# Patient Record
Sex: MALE | Race: BLACK OR AFRICAN AMERICAN | NOT HISPANIC OR LATINO | Employment: OTHER | URBAN - METROPOLITAN AREA
[De-identification: names, ages, dates, MRNs, and addresses within clinical notes are randomized per-mention and may not be internally consistent; named-entity substitution may affect disease eponyms.]

---
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
## 2022-04-20 ENCOUNTER — HOSPITAL ENCOUNTER (EMERGENCY)
Facility: CLINIC | Age: 22
Discharge: HOME | End: 2022-04-20
Attending: EMERGENCY MEDICINE | Admitting: EMERGENCY MEDICINE
Payer: COMMERCIAL

## 2022-04-20 VITALS
DIASTOLIC BLOOD PRESSURE: 79 MMHG | BODY MASS INDEX: 31.5 KG/M2 | OXYGEN SATURATION: 100 % | SYSTOLIC BLOOD PRESSURE: 134 MMHG | TEMPERATURE: 98.7 F | RESPIRATION RATE: 18 BRPM | HEART RATE: 79 BPM | WEIGHT: 252 LBS

## 2022-04-20 DIAGNOSIS — R45.89 DYSPHORIC MOOD: ICD-10-CM

## 2022-04-20 LAB
ANION GAP SERPL CALCULATED.3IONS-SCNC: 4 MMOL/L (ref 3–14)
BASOPHILS # BLD AUTO: 0 10E3/UL (ref 0–0.2)
BASOPHILS NFR BLD AUTO: 0 %
BUN SERPL-MCNC: 13 MG/DL (ref 7–30)
CALCIUM SERPL-MCNC: 8.9 MG/DL (ref 8.5–10.1)
CHLORIDE BLD-SCNC: 106 MMOL/L (ref 98–110)
CO2 SERPL-SCNC: 26 MMOL/L (ref 20–32)
COHGB MFR BLD: 1 % (ref 0–2)
CREAT SERPL-MCNC: 0.81 MG/DL (ref 0.5–1)
EOSINOPHIL # BLD AUTO: 0.1 10E3/UL (ref 0–0.7)
EOSINOPHIL NFR BLD AUTO: 1 %
ERYTHROCYTE [DISTWIDTH] IN BLOOD BY AUTOMATED COUNT: 12.3 % (ref 10–15)
GFR SERPL CREATININE-BSD FRML MDRD: >90 ML/MIN/1.73M2
GLUCOSE BLD-MCNC: 98 MG/DL (ref 70–99)
GLUCOSE BLDC GLUCOMTR-MCNC: 100 MG/DL (ref 70–99)
HCT VFR BLD AUTO: 41.5 % (ref 40–53)
HGB BLD-MCNC: 13.5 G/DL (ref 13.3–17.7)
IMM GRANULOCYTES # BLD: 0 10E3/UL
IMM GRANULOCYTES NFR BLD: 0 %
LYMPHOCYTES # BLD AUTO: 1.1 10E3/UL (ref 0.8–5.3)
LYMPHOCYTES NFR BLD AUTO: 13 %
MCH RBC QN AUTO: 27.6 PG (ref 26.5–33)
MCHC RBC AUTO-ENTMCNC: 32.5 G/DL (ref 31.5–36.5)
MCV RBC AUTO: 85 FL (ref 78–100)
MONOCYTES # BLD AUTO: 0.7 10E3/UL (ref 0–1.3)
MONOCYTES NFR BLD AUTO: 9 %
NEUTROPHILS # BLD AUTO: 6.5 10E3/UL (ref 1.6–8.3)
NEUTROPHILS NFR BLD AUTO: 77 %
NRBC # BLD AUTO: 0 10E3/UL
NRBC BLD AUTO-RTO: 0 /100
PLATELET # BLD AUTO: 213 10E3/UL (ref 150–450)
POTASSIUM BLD-SCNC: 3.7 MMOL/L (ref 3.4–5.3)
RBC # BLD AUTO: 4.9 10E6/UL (ref 4.4–5.9)
SODIUM SERPL-SCNC: 136 MMOL/L (ref 133–144)
TSH SERPL DL<=0.005 MIU/L-ACNC: 0.84 MU/L (ref 0.4–4)
WBC # BLD AUTO: 8.5 10E3/UL (ref 4–11)

## 2022-04-20 PROCEDURE — 85025 COMPLETE CBC W/AUTO DIFF WBC: CPT | Performed by: EMERGENCY MEDICINE

## 2022-04-20 PROCEDURE — 90791 PSYCH DIAGNOSTIC EVALUATION: CPT

## 2022-04-20 PROCEDURE — 36415 COLL VENOUS BLD VENIPUNCTURE: CPT | Performed by: EMERGENCY MEDICINE

## 2022-04-20 PROCEDURE — 82375 ASSAY CARBOXYHB QUANT: CPT | Performed by: EMERGENCY MEDICINE

## 2022-04-20 PROCEDURE — 80048 BASIC METABOLIC PNL TOTAL CA: CPT | Performed by: EMERGENCY MEDICINE

## 2022-04-20 PROCEDURE — 84443 ASSAY THYROID STIM HORMONE: CPT | Performed by: EMERGENCY MEDICINE

## 2022-04-20 PROCEDURE — 99285 EMERGENCY DEPT VISIT HI MDM: CPT | Mod: 25

## 2022-04-20 ASSESSMENT — ENCOUNTER SYMPTOMS
DIARRHEA: 0
WEAKNESS: 1
APPETITE CHANGE: 1
SORE THROAT: 1
DYSPHORIC MOOD: 1
COUGH: 0
FATIGUE: 1
FEVER: 0

## 2022-04-20 NOTE — ED TRIAGE NOTES
"Patient states that he feels like he is \"high\" but that he has not taken any drugs and states this feel is worse than it has been.  Patient states previously he was only having this feeling at night and today he has it all day.  Patient states he feels confused, has not had a urge to eat or interact.  Patient states he had this feeling years ago after smoking Marijuana but that he hasn't smoked marijuana for over a month. Patient appropriate in triage.   "

## 2022-04-20 NOTE — ED PROVIDER NOTES
History   Chief Complaint:  Altered Mental Status       HPI   Pavithra Davenport is a 21 year old male with history of ADHD and ODD who presents with altered mental status. The patient reports that he feels disoriented and disconnected to his body. He states it started 3 days ago and he feels like he is in a dream like state. The patient states he has an increase in depression, fatigue, lack of interest, generalized body weakness, and decreased appetite. He reports he also has an onset of sore throat 2 days ago. The patient denies any fever, cough and diarrhea. He notes he had a similar episode 3 years ago that lasted 3 weeks long. He states he has used recreational marijuana in the past but denies any recent use.     Review of Systems   Constitutional: Positive for appetite change and fatigue. Negative for fever.   HENT: Positive for sore throat.    Respiratory: Negative for cough.    Gastrointestinal: Negative for diarrhea.   Neurological: Positive for weakness.   Psychiatric/Behavioral: Positive for dysphoric mood.   All other systems reviewed and are negative.    Allergies:  The patient has no known allergies.     Medications:  Albuterol      Past Medical History:     ADHD   ODD   Asthma     Family History:    Mother- asthma, diabetes, hypertension, hyperlipidemia, fibromyalgia   Father- asthma, psoriatic arthritis     Social History:  The patient presents with his dad.     Physical Exam     Patient Vitals for the past 24 hrs:   BP Temp Temp src Pulse Resp SpO2 Weight   04/20/22 1710 134/79 98.7  F (37.1  C) Oral 79 18 100 % 114.3 kg (252 lb)       Physical Exam      HEENT:    Oropharynx is moist, without lesions or trismus.  Eyes:    Conjunctiva normal, PERRL  Neck:    Supple, no meningismus.     CV:     Regular rate and rhythm.      No murmurs, rubs or gallops.   PULM:    Clear to auscultation bilateral.       No respiratory distress.      Good air exchange.     No rales or wheezing.  ABD:    Soft, non-tender,  non-distended.       No rebound, guarding or rigidity.  MSK:     No gross deformity to all four extremities.   LYMPH:   No cervical lymphadenopathy.  NEURO:   Alert and oriented x 3.      CN II-XII intact, speech is clear with no aphasia.     Strength is 5/5 in all 4 extremities.  Sensation is intact.       Normal muscular tone, no tremor.  Skin:    Warm, dry and intact.    Psych:    Mood is depressed, affect is appropriate and congruent.     No delusions, hallucinations.     Judgement is appropriate.     Memory intact.    Emergency Department Course     Laboratory:  Labs Ordered and Resulted from Time of ED Arrival to Time of ED Departure   GLUCOSE BY METER - Abnormal       Result Value    GLUCOSE BY METER POCT 100 (*)    BASIC METABOLIC PANEL - Normal    Sodium 136      Potassium 3.7      Chloride 106      Carbon Dioxide (CO2) 26      Anion Gap 4      Urea Nitrogen 13      Creatinine 0.81      Calcium 8.9      Glucose 98      GFR Estimate >90     TSH WITH FREE T4 REFLEX - Normal    TSH 0.84     CARBON MONOXIDE - Normal    Carbon Monoxide 1.0     CBC WITH PLATELETS AND DIFFERENTIAL    WBC Count 8.5      RBC Count 4.90      Hemoglobin 13.5      Hematocrit 41.5      MCV 85      MCH 27.6      MCHC 32.5      RDW 12.3      Platelet Count 213      % Neutrophils 77      % Lymphocytes 13      % Monocytes 9      % Eosinophils 1      % Basophils 0      % Immature Granulocytes 0      NRBCs per 100 WBC 0      Absolute Neutrophils 6.5      Absolute Lymphocytes 1.1      Absolute Monocytes 0.7      Absolute Eosinophils 0.1      Absolute Basophils 0.0      Absolute Immature Granulocytes 0.0      Absolute NRBCs 0.0          Procedures      Emergency Department Course:         Reviewed:  I reviewed nursing notes, vitals and past medical history    Assessments:  1734 I obtained history and examined the patient as noted above.     1911 I rechecked the patient and explained findings.     Consults:  2007 I spoke with DEC regarding  "patient's presentation, findings, and plan of care.      Disposition:  The patient was discharged to home.     Impression & Plan     CMS Diagnoses: None    Medical Decision Makin-year-old male presented to the ED with a sense of feeling \"disconnected.\"  This is associated with increased depressive symptoms, fatigue, lack of interest and poor appetite.  He was evaluated for a medical cause.  No electrolyte disturbance, hypo-/hyperglycemia, thyroid dysfunction.  Father was concerned about potential carbon monoxide exposure but level within normal limits.  He has no features to draw concern for an acute intracranial event.  I feel his symptoms are most likely related to mental health disorder and specifically depression.  Patient was evaluated by DEC who agrees with outpatient management.  Outpatient resources scheduled.  Close follow-up primary care physician and return to ED for any worsening symptoms.    Diagnosis:    ICD-10-CM    1. Dysphoric mood  R45.89        Scribe Disclosure:  I, Ruth Dickson, am serving as a scribe at 5:33 PM on 2022 to document services personally performed by Papi Slade MD based on my observations and the provider's statements to me.              Papi Slade MD  22    "

## 2022-04-21 NOTE — ED NOTES
4/20/2022  Pavithra Davenport 2000     [facility] Crisis Assessment    Patient was assessed: remote  Patient location: [facility]    Referral Data and Chief Complaint  Pavithra is a 21 year old who uses he/him pronouns. Patient presented to the ED with family/friends and was referred to the ED by self. Patient is presenting to the ED for the following concerns: pt presents for feelings of derealization.      Informed Consent and Assessment Methods    Patient is his own guardian. Writer met with patient and explained the crisis assessment process, including applicable information disclosures and limits to confidentiality, assessed understanding of the process, and obtained consent to proceed with the assessment. Patient was observed to be able to participate in the assessment as evidenced by alert and oriented. Assessment methods included conducting a formal interview with patient, review of medical records, collaboration with medical staff, and obtaining relevant collateral information from family and community providers when available.    Narrative Summary of Presenting Problem and Current Functioning  What led to the patient presenting for crisis services, factors that make the crisis life threatening or complex, stressors, how is this disrupting the patient's life, and how current functioning is in comparison to baseline. How is patient presenting during the assessment.     Pt presents to ED for feelings of derealization. Pt reports he has been feeling like this for a week or so. Pt reports he has experienced this before, after he has used marijuana, however, reports he has not used marijuana for approximately 2 months. Pt presents as anxious, alert, oriented, and engaged. Pt appropriately engaged in aftercare planning and appears open to mental health services. Pt appears to be functioning below his baseline. Pt denies SI/SIB/SA/HI and denies plans, means, and intent to harm himself or others.    History of the  Crisis  Duration of the current crisis, coping skills attempted to reduce the crisis, community resources used, and past presentations.    Pt reports a history of anxiety and depression. Pt denies current involvement in mental health services, and reports a history of medication management and therapy.    Collateral Information  The following information was received from Billy whose relationship to the patient is father. Information was obtained via video. Their phone number is [phone] and they last had contact with patient on today.    What happened today: father confirms pt report    What is different about patient's functioning: father reports he has observed pt to be more anxious    Concern about alcohol/drug use: No    What do you think the patient needs: father reports pt is in need of therapy     Has patient made comments about wanting to kill themselves/others:  No    If d/c is recommended, can they take part in safety/aftercare planning: Yes father reports he is able to transport and support pt in making and attending appoitnments and using skills at home    Other information: N/A          Risk Assessment    Risk of Harm to Self     ESS-6  1.a. Over the past 2 weeks, have you had thoughts of killing yourself? No  1.b. Have you ever attempted to kill yourself and, if yes, when did this last happen? No   2. Recent or current suicide plan? No   3. Recent or current intent to act on ideation? No  4. Lifetime psychiatric hospitalization? No  5. Pattern of excessive substance use? No  6. Current irritability, agitation, or aggression? No  Scoring note: BOTH 1a and 1b must be yes for it to score 1 point, if both are not yes it is zero. All others are 1 point per number. If all questions 1a/1b - 6 are no, risk is negligible. If one of 1a/1b is yes, then risk is mild. If either question 2 or 3, but not both, is yes, then risk is automatically moderate regardless of total score. If both 2 and 3 are yes, risk is  "automatically high regardless of total score.     Score: 0, negligible risk    The patient has the following risk factors for suicide: depressive symptoms, isolation and significant behavioral changes    Is the patient experiencing current suicidal ideation: No    Is the patient engaging in preparatory suicide behaviors (formulating how to act on plan, giving away possessions, saying goodbye, displaying dramatic behavior changes, etc)? No    Does the patient have access to firearms or other lethal means? no    The patient has the following protective factors: social support, voluntarily seeking mental health support, displays resiliency , expresses desire to engage in treatment, sense of obligation to people/pets and safe/stable housing    Support system information: pt reports his family and friends are supportive to him    Patient strengths: pt displays resiliency and motivation    Does the patient engage in non-suicidal self-injurious behavior (NSSI/SIB)? no    Is the patient vulnerable to sexual exploitation?  No    Is the patient experiencing abuse or neglect? no    Is the patient a vulnerable adult? No      Risk of Harm to Others  The patient has the following risk factors of harm to others: no risk factors identified    Does the patient have thoughts of harming others? No    Is the patient engaging in sexually inappropriate behavior?  no       Current Substance Abuse    Is there recent substance abuse? Substance type(s): alcohol Frequency: 1x per week Quantity: \"enough to get intoxicated\" Method: drink Duration: unknown Last use: \"the other day\"    Was a urine drug screen or blood alcohol level obtained: No    CAGE AID  Have you felt you ought to cut down on your drinking or drug use?  No  Have people annoyed you by criticizing your drinking or drug use? No  Have you felt bad or guilty about your drinking or drug use? No  Have you ever had a drink or used drugs first thing in the morning to steady your nerves " or to get rid of a hangover? No  Score: 0/4       Current Symptoms/Concerns    Symptoms  Attention, hyperactivity, and impulsivity symptoms present: Yes: Disorganized/Forgetful    Anxiety symptoms present: Yes: Generalized Symptoms: Cognitive anxiety - feelings of doom, racing thoughts, difficulty concentrating  and Excessive worry      Appetite symptoms present: Yes: Loss of Appetite     Behavioral difficulties present: No     Cognitive impairment symptoms present: No    Depressive symptoms present: Yes Appetite change/weight change , Depressed mood, Loss of interest / Anhedonia  and Sleep disturbance      Eating disorder symptoms present: No    Learning disabilities, cognitive challenges, and/or developmental disorder symptoms present: No     Manic/hypomanic symptoms present: No    Personality and interpersonal functioning difficulties present : No    Psychosis symptoms present: No      Sleep difficulties present: Yes: Difficulty falling asleep  and Difficulty staying sleep     Substance abuse disorder symptoms present: No     Trauma and stressor related symptoms present: No       Mental Status Exam   Affect: Appropriate   Appearance: Appropriate    Attention Span/Concentration: Other: disorganized?    Eye Contact: Engaged   Fund of Knowledge: Appropriate    Language /Speech Content: Fluent   Language /Speech Volume: Normal    Language /Speech Rate/Productions: Normal    Recent Memory: Intact   Remote Memory: Intact   Mood: Anxious    Orientation to Person: Yes    Orientation to Place: Yes   Orientation to Time of Day: Yes    Orientation to Date: Yes    Situation (Do they understand why they are here?): Yes    Psychomotor Behavior: Normal    Thought Content: Clear   Thought Form: Tangential       Mental Health and Substance Abuse History    History  Current and historical diagnoses or mental health concerns: Pt reports a history of anxiety and depression.    Prior MH services (inpatient, programmatic care,  "outpatient, etc) : Yes Pt denies current involvement in mental health services, and reports a history of medication management and therapy.    Has the patient used [facility] crisis team services before?: No    History of substance abuse: No    Prior KIRK services (inpatient, programmatic care, detox, outpatient, etc) : No    History of commitment: No    Family history of MH/KIRK: Yes pt reports a history of substance abuse    Trauma history: Yes pt reports his mother abused substances when he was younger    Medication  Psychotropic medications: No current medications but a history of \"ADHD medications\".    Current Care Team  Primary Care Provider: Yes. Name: Dr. Michaela Hinton. Location: [facility]. Date of last visit: 2019. Frequency: as needed. Perceived helpfulness: pt reports needs are met.    Psychiatrist: No    Therapist: No    : No    CTSS or ARMHS: No    ACT Team: No    Other: No    Release of Information  Was a release of information signed: Yes. Providers included on the release: PCP      Biopsychosocial Information    Socioeconomic Information  Current living situation: Pt reports he lives with his father    Employment/income source: pt reports he was just hired to work at [employer]    Relevant legal issues: pt denies    Cultural, Denominational, or spiritual influences on mental health care: pt denies    Is the patient active in the  or a : No      Relevant Medical Concerns   Patient identifies concerns with completing ADLs? No     Patient can ambulate independently? Yes     Other medical concerns? No     History of concussion or TBI? No        Diagnosis    300.02 (F41.1) Generalized Anxiety Disorder - by history     296.31 (F33.0) Major Depressive Disorder, Recurrent Episode, Mild _ - by history       Therapeutic Intervention  The following therapeutic methodologies were employed when working with the patient: establishing rapport, active listening, assessing dimensions of crisis, " solution focused brief therapy, identifying additional supports and alternative coping skills, establishing a discharge plan, safety planning, psychoeducation, motivational interviewing, brief supportive therapy, trauma informed care, DBT skills, treatment planning and harm reduction. Patient response to intervention: pt appears willing and open to interventions.      Disposition  Recommended disposition: Individual Therapy and Medication Management      Reviewed case and recommendations with attending provider. Attending Name: Dr. Slade      Attending concurs with disposition: Yes      Patient concurs with disposition: Yes      Guardian concurs with disposition: NA     Final disposition: Individual therapy  and Medication management.       Clinical Substantiation of Recommendations   Rationale with supporting factors for disposition and diagnosis.     Pt presents to ED for feelings of derealization. Pt reports he has been feeling like this for a week or so. Pt reports he has experienced this before, after he has used marijuana, however, reports he has not used marijuana for approximately 2 months. Pt presents as anxious, alert, oriented, and engaged. Pt appropriately engaged in aftercare planning and appears open to mental health services. Pt appears to be functioning below his baseline. Pt denies SI/SIB/SA/HI and denies plans, means, and intent to harm himself or others. Pt is recommended to therapy and medication management and was scheduled for appointments.       Assessment Details  Patient interview started at: 7:30PM and completed at: 8:03PM.    Total duration spent on the patient case in minutes: .75 hrs     CPT code(s) utilized: 93011 - Psychotherapy for Crisis - 60 (30-74*) min       Aftercare and Safety Planning  Follow up plans with MH/KIRK services: Yes follow up with therapy and medication management appointments      Aftercare plan placed in the AVS and provided to patient: Yes. Given to patient by  RN    Ne Dumas, [facility]        Aftercare Plan    Upcoming Appointments    Date: Wednesday, 4/27/2022  Time: 9:00 am - 10:00 am  Provider: Elizabeth Chahal  Supervised by: Amber Lincoln MA  [facility]  Location: [facility], [address] [place] [id number]  Phone: [phone]  Type: Teletherapy    Patient Instructions: For all appointments: you will be sent information to fill out the intake paperwork online. Please fill the paperwork prior to your appointment. For telehealth appointments: you will also be sent a zoom link to attend the appointment remotely.      Date: Thursday, 5/5/2022  Time: 11:00 am - 12:00 pm  Provider: Anna Srivastava  MSN  CNP,PMHNP,RN  Location: [facility], [address] [place] [address] [address], [place] [address]  Phone: [phone]  Type: Medication Mgmt - Initial (In-Person)      If I am feeling unsafe or I am in a crisis, I will:   Contact my established care providers   Call the National Suicide Prevention Lifeline: 884.358.2002   Go to the nearest emergency room   Call 911     Warning signs that I or other people might notice when a crisis is developing for me:     I am having increasing suicidal thoughts that turn to plans with intent or means  I am having additional urges to self-harm    My emotions are of hopelessness; feeling like there's no way out.  Rage or anger.  Engaging in risky activities without thinking  Withdrawing from family/friends  Dramatic mood swings  Drastic personality changes   Use of alcohol or drugs  Postings on social media  Neglect of personal hygiene or cares     Things I am able to do on my own to cope or help me feel better:    Spending quality time with loved ones  Staying hydrated  Eating balanced meals  Going for a walk every day  Take care of daily responsibilities/needs  Focus on positive self-talk vs negative self-talk    Things that I am able to do with others to cope or help me better:   Exercise  Music  Deep  breathing  Meditations  Journal  Self-regulate  Self check-in  Ask for help  5, 4, 3, 2, 1  Progressive muscle relaxation    Reduce Extreme Emotion  QUICKLY:  Changing Your Body Chemistry      T:  Change your body Temperature to change your autonomic nervous system     Use Ice Water to calm yourself down FAST     Put your face in a bowl of ice water (this is the best way; have the person keep his/her face in ice water for 30-45 seconds - initial research is showing that the longer s/he can hold her/his face in the water, the better the response), or     Splash ice water on your face, or hold an ice pack on your face      I:  Intensely exercise to calm down a body revved up by emotion     Examples: running, walking fast, jumping, playing basketball, weight lifting, swimming, calisthenics, etc.     Engage in exercises that DO NOT include violent behaviors. Exercises that utilize violent behaviors tend to function as  behavioral rehearsal,  and rather than calming the person down, may actually  rev  the person up more, increasing the likelihood of violence, and lessening the likelihood that they will  burn off  energy     P:  Progressively relax your muscles     Starting with your hands, moving to your forearms, upper arms, shoulders, neck, forehead, eyes, cheeks and lips, tongue and teeth, chest, upper back, stomach, buttocks, thighs, calves, ankles, feet     Tense (10 seconds,   of the way), then relax each muscle (all the way)     Notice the tension     Notice the difference when relaxed (by tensing first, and then relaxing, you are able to get a more thorough relaxation than by simply relaxing)      P: Paced breathing to relax     The standard technique is to begin with counting the number of steps one takes for a typical inhale, then counting the steps one takes for a typical exhale, and then lengthening the amount of steps for the exhalation by one or two steps.  OR    Repeat this pattern for 1-2  minutes    Inhale for four (4) seconds     Exhale for six (6) to eight (8) seconds     Research demonstrated that one can change one's overall level of anxiety by doing this exercise for even a few minutes per day        Things I can use or do for distraction:   Reach out to/spend time with family, friends  Shower  Exercise  Chores or do a project  Listen to music  Watch movie/TV  Listening to music  Journaling  Reading a book  Meditating  Call a friend    Changes I can make to support my mental health and wellness:    -I will abstain from all mood altering chemicals not currently prescribed to me   -I will attend scheduled mental health therapy and psychiatric appointments and follow all   recommendations  -I will commit to 30 minutes of self care daily - this can be as simple as taking a shower, going for a   walk, cooking a meal, read, writing, etc  -I will practice square breathing when I begin to feel anxious - in breath through the nose for the count   of 4 and the first line on the square. Out breath through the mouth for the count of 4 for the second line   of the square. Repeat to complete the square. Repeat the square as many times as needed.  - I will use distraction skills of: going for walks, watching TV, spending time outside, calling a friend or   family member  -Use community resources, including hotline numbers, [facility] crisis and support meetings  -Maintain a daily schedule/routine  -Practice deep breathing skills  -Download a meditation yoandy and spend 15-20 minutes per day mediating/relaxing. Some apps to   download include: Calm, Headspace and Insight Timer. All 3 of these apps have free version    People in my life that I can ask for help:   Family  Friends    Your [facility] has a mental health crisis team you can call 24/7:   [facility] Crisis Line Number: [phone]      Other things that are important when I'm in crisis:   Ask for help    Additional resources and information:     Mental Health  "Apps  My3  https://my3app.org/    VirtualHopeBox  [email or web address]       Professionals or Agencies I Can Contact During A Crisis:       Crisis Lines  Crisis Text Line  Text 317913  You will be connected with a trained live crisis counselor to provide support.    [employer] (LGBTQ Youth Crisis Line)  8.936.450.0429  text START to 640-349    National [place] on Mental Illness (UMBERTO)  684.934.9328 or 3.810.UMBERTO.HELPS    National Suicide Prevention Lifeline at 4-938-484-YYVO (3054)     Throughout  [place]: call **CRISIS (**151080)     Crisis Text Line: is available for free, 24/7 by texting [place] to 089782    [facility]  Fast Tracker  Linking people to mental health and substance use disorder resources  [employer].org     [place] Mental Health Warm Line  Peer to peer support  Monday thru Saturday, 12 pm to 10 pm  436.527.4020 or 1.596.915.1126  Text \"Support\" to 14287     National [place] on Mental Illness (www.[place].umberto.org): 808.963.4460 or 087-217-0757     [facility] Phone (free remote counseling): [phone] Web address:   [email or web address]/     www.[employer] (filter for insurance, gender preference, etc.)    [facility]  [phone]  *offers individual therapy, medication management and Mental Health Case Workers; can self refer    Please follow up with scheduled providers to ensure all necessary paperwork is filled out prior to your   scheduled telehealth appointments.     Coordinators from [facility] will be calling within two business days to ensure   that you have the resources you may need or provide assistance with scheduling (Phone number: [phone].).    Remember: give the referrals 3 sessions prior to calling it quits. Do you trust them? Do you feel   understood? Do you think they can help? Check in with yourself after each session    Please reach out to the [facility]([phone]) regarding further mental health appointment needs for this emergency department visit.    [facility] SCHEDULING:  Today you were seen by a licensed mental health professional through [facility] sevices, [facility] ([facility])  for a crisis assessment in the Emergency Department at [facility].  It is recommended that you follow up with your estabished providers (psychiatrist, menta health therapist, and/or primary care doctor - as relevant) as soon as possible. Coordinators from [facility] will be calling you in the next 24-48 hours to ensure that you have the resources you need.  You can so contact [facility] coordinators directly at [phone].     [facility] maintains an extensive network of licensed behavioral health providers to connect patients with the services they need.  We do not charge providers a fee to participate in our referral network.  We match patients with providers based on a patient s specific needs, insurance coverage, and location.  Our first effort will be to refer you to a provider within your care system, and will utilize providers outside your care system as needed.     Additional information  Today you were seen by a licensed mental health professional through [facility] and Transition services, [facility] ([facility])  for a crisis assessment in the Emergency Department at [facility].  It is recommended that you follow up with your established providers (psychiatrist, mental health therapist, and/or primary care doctor - as relevant) as soon as possible. Coordinators from [facility] will be calling you in the next 24-48 hours to ensure that you have the resources you need.  You can also contact [facility] coordinators directly at [phone]. You may have been scheduled for or offered an appointment with a mental health provider. [facility] maintains an extensive network of licensed behavioral health providers to connect patients with the services they  need.  We do not charge providers a fee to participate in our referral network.  We match patients with providers based on a patient's specific needs, insurance coverage, and location.  Our first effort will be to refer you to a provider within your care system, and will utilize providers outside your care system as needed.

## 2022-04-21 NOTE — DISCHARGE INSTRUCTIONS
Aftercare Plan    Upcoming Appointments    Date: Wednesday, 4/27/2022  Time: 9:00 am - 10:00 am  Provider: Elizabeth Chahal  Supervised by: Amber Lincoln MA  [facility]  Location: [facility], [address] [place], [place] [id number]  Phone: [phone]  Type: Teletherapy    Patient Instructions: For all appointments: you will be sent information to fill out the intake paperwork online. Please fill the paperwork prior to your appointment. For telehealth appointments: you will also be sent a zoom link to attend the appointment remotely.      Date: Thursday, 5/5/2022  Time: 11:00 am - 12:00 pm  Provider: Anna Srivastava  MSN  CNP,PMHNP,RN  Location: [facility], [address] [place] [address], [facility] [address], [place] [address]  Phone: [phone]  Type: Medication Mgmt - Initial (In-Person)      If I am feeling unsafe or I am in a crisis, I will:   Contact my established care providers   Call the National Suicide Prevention Lifeline: 340.931.2193   Go to the nearest emergency room   Call 911     Warning signs that I or other people might notice when a crisis is developing for me:     I am having increasing suicidal thoughts that turn to plans with intent or means  I am having additional urges to self-harm    My emotions are of hopelessness; feeling like there's no way out.  Rage or anger.  Engaging in risky activities without thinking  Withdrawing from family/friends  Dramatic mood swings  Drastic personality changes   Use of alcohol or drugs  Postings on social media  Neglect of personal hygiene or cares     Things I am able to do on my own to cope or help me feel better:    Spending quality time with loved ones  Staying hydrated  Eating balanced meals  Going for a walk every day  Take care of daily responsibilities/needs  Focus on positive self-talk vs negative self-talk    Things that I am able to do with others to cope or help me better:   Exercise  Music  Deep  breathing  Meditations  Journal  Self-regulate  Self check-in  Ask for help  5, 4, 3, 2, 1  Progressive muscle relaxation    Reduce Extreme Emotion  QUICKLY:  Changing Your Body Chemistry      T:  Change your body Temperature to change your autonomic nervous system   Use Ice Water to calm yourself down FAST   Put your face in a bowl of ice water (this is the best way; have the person keep his/her face in ice water for 30-45 seconds - initial research is showing that the longer s/he can hold her/his face in the water, the better the response), or   Splash ice water on your face, or hold an ice pack on your face      I:  Intensely exercise to calm down a body revved up by emotion   Examples: running, walking fast, jumping, playing basketball, weight lifting, swimming, calisthenics, etc.   Engage in exercises that DO NOT include violent behaviors. Exercises that utilize violent behaviors tend to function as  behavioral rehearsal,  and rather than calming the person down, may actually  rev  the person up more, increasing the likelihood of violence, and lessening the likelihood that they will  burn off  energy     P:  Progressively relax your muscles   Starting with your hands, moving to your forearms, upper arms, shoulders, neck, forehead, eyes, cheeks and lips, tongue and teeth, chest, upper back, stomach, buttocks, thighs, calves, ankles, feet   Tense (10 seconds,   of the way), then relax each muscle (all the way)   Notice the tension   Notice the difference when relaxed (by tensing first, and then relaxing, you are able to get a more thorough relaxation than by simply relaxing)      P: Paced breathing to relax   The standard technique is to begin with counting the number of steps one takes for a typical inhale, then counting the steps one takes for a typical exhale, and then lengthening the amount of steps for the exhalation by one or two steps.  OR  Repeat this pattern for 1-2 minutes  Inhale for four (4) seconds    Exhale for six (6) to eight (8) seconds   Research demonstrated that one can change one's overall level of anxiety by doing this exercise for even a few minutes per day        Things I can use or do for distraction:   Reach out to/spend time with family, friends  Shower  Exercise  Chores or do a project  Listen to music  Watch movie/TV  Listening to music  Journaling  Reading a book  Meditating  Call a friend    Changes I can make to support my mental health and wellness:    -I will abstain from all mood altering chemicals not currently prescribed to me   -I will attend scheduled mental health therapy and psychiatric appointments and follow all   recommendations  -I will commit to 30 minutes of self care daily - this can be as simple as taking a shower, going for a   walk, cooking a meal, read, writing, etc  -I will practice square breathing when I begin to feel anxious - in breath through the nose for the count   of 4 and the first line on the square. Out breath through the mouth for the count of 4 for the second line   of the square. Repeat to complete the square. Repeat the square as many times as needed.  - I will use distraction skills of: going for walks, watching TV, spending time outside, calling a friend or   family member  -Use community resources, including hotline numbers, [facility] crisis and support meetings  -Maintain a daily schedule/routine  -Practice deep breathing skills  -Download a meditation yoandy and spend 15-20 minutes per day mediating/relaxing. Some apps to   download include: Calm, Headspace and Insight Timer. All 3 of these apps have free version    People in my life that I can ask for help:   Family  Friends    Your [facility] has a mental health crisis team you can call 24/7:   [facility] Crisis Line Number: [phone]      Other things that are important when I'm in crisis:   Ask for help    Additional resources and information:     Mental Health Apps  My3   "https://my3app.org/    VirtualHopeBox  https://GuidePal/apps/virtual-hope-box/       Professionals or Agencies I Can Contact During A Crisis:       Crisis Lines  Crisis Text Line  Text 642457  You will be connected with a trained live crisis counselor to provide support.    The [employer] Project (LGBTQ Youth Crisis Line)  0.601.640.5852  text START to 810-840    National Normanna on Mental Illness (UMBERTO)  974.409.8506 or 6.88.UMBERTO.HELPS    National Suicide Prevention Lifeline at 0-431-594-GYPV (3806)     Throughout  [place]: call **CRISIS (**701126)     Crisis Text Line: is available for free, 24/7 by texting [place] to 434217    Productify  Fast Tracker  Linking people to mental health and substance use disorder resources  Ideal Power.org     [place] Mental Health Warm Line  Peer to peer support  Monday thru Saturday, 12 pm to 10 pm  740.512.8111 or 1.232.488.6029  Text \"Support\" to 19995     National Normanna on Mental Illness (www.[place].umberto.org): 795.165.2851 or 007-194-4145     [facility] Phone (free remote counseling): 711.306.8379 Web address:   https://VasoGenix.org/     www.S[employer] Media (filter for insurance, gender preference, etc.)    [facility]  [phone]  *offers individual therapy, medication management and Mental Health Case Workers; can self refer    Please follow up with scheduled providers to ensure all necessary paperwork is filled out prior to your   scheduled telehealth appointments.     Coordinators from [facility] will be calling within two business days to ensure   that you have the resources you may need or provide assistance with scheduling (Phone number: [phone].).    Remember: give the referrals 3 sessions prior to calling it quits. Do you trust them? Do you feel   understood? Do you think they can help? Check in with yourself after each session    Please reach out to the Diagnostic Evaluation " [place]([phone]) regarding further mental health appointment needs for this emergency department visit.    [facility] SCHEDULING:  Today you were seen by a licensed mental health professional through [facility] and Transition sevices, [facility] ([facility])  for a crisis assessment in the Emergency Department at [facility].  It is recommended that you follow up with your estabished providers (psychiatrist, menta health therapist, and/or primary care doctor - as relevant) as soon as possible. Coordinators from [facility] will be calling you in the next 24-48 hours to ensure that you have the resources you need.  You can so contact [facility] coordinators directly at [phone].     [facility] maintains an extensive network of licensed behavioral health providers to connect patients with the services they need.  We do not charge providers a fee to participate in our referral network.  We match patients with providers based on a patient s specific needs, insurance coverage, and location.  Our first effort will be to refer you to a provider within your care system, and will utilize providers outside your care system as needed.     Additional information  Today you were seen by a licensed mental health professional through Triage and Transition services, [facility] ([facility])  for a crisis assessment in the Emergency Department at [facility].  It is recommended that you follow up with your established providers (psychiatrist, mental health therapist, and/or primary care doctor - as relevant) as soon as possible. Coordinators from [facility] will be calling you in the next 24-48 hours to ensure that you have the resources you need.  You can also contact [facility] coordinators directly at [phone]. You may have been scheduled for or offered an appointment with a mental health provider. [facility] maintains an extensive network of licensed behavioral health providers to connect patients with the services they need.  We  do not charge providers a fee to participate in our referral network.  We match patients with providers based on a patient's specific needs, insurance coverage, and location.  Our first effort will be to refer you to a provider within your care system, and will utilize providers outside your care system as needed.

## 2022-04-26 ENCOUNTER — TELEPHONE (OUTPATIENT)
Dept: FAMILY MEDICINE | Facility: CLINIC | Age: 22
End: 2022-04-26
Payer: COMMERCIAL

## 2022-04-26 DIAGNOSIS — F41.9 ANXIETY: Primary | ICD-10-CM

## 2022-04-26 NOTE — TELEPHONE ENCOUNTER
Called # below     Advised pt's father on the information below     Patient's father stated an understanding and agreed with plan.    Zahra Steven RN, BSN  [facility] [place] [facility]

## 2022-04-26 NOTE — TELEPHONE ENCOUNTER
There is no C2C on file for father     Routing to PCP for review     Zahra Steven RN, BSN  [facility]

## 2022-04-26 NOTE — TELEPHONE ENCOUNTER
OK, thanks for update.  I will also place a referral for psychiatry.  Especially with concerns of bipolar disorder, they will need to be involved.  They usually book out a ways so I'd like them to schedule this asap.  They can definitely see me in the interim.      Michaela Hinton MBA, MS, PA-C  [facility]

## 2022-04-26 NOTE — TELEPHONE ENCOUNTER
Called # below     Dad stated that pt was in the hospital on 4/20/2022 he is not going in to the hospital today. He is going to the city with his mom.   Dad stated that pt is set up for therapy from the hospital - but he is concerned as pt's anxiety has been increasing and been having very little interest I things at home. He also has noted that he feels detachedfrom reality.   Denies wanting to harm self or harming others  - pt is safe and with his mom right now  On no medications    Dad just want to FLORINDA CARLOS- to be aware of the hospital visit and that once pt is back on Thursday he will call to make an follow up appointment for the pt with Jose Alfredo CARLOS,  as he thinks pt may be bipolar -     RN advised that pt needs to keep his therapy appointments as well as keeping in touch with his PCP with phone or in person visits.   RN offered the crisis line number and dad said he does not need them as he does not feel the pt will be in crisis    Dad said he will call once he sees pt on Thursday     Routing to PCP as an ELINOR Steven RN, BSN  [facility]

## 2022-04-26 NOTE — TELEPHONE ENCOUNTER
Reason for Call:  Other call back    Detailed comments: TRACEY IS SUFFERING FROM MENTAL HEALTH. DAD THINKS PT MAY BE BI-POLAR. TRACEY HAD TO MISS TODAYS APPT BECAUSE TAKEN TO HOSP FOR MENTAL HEALTH. DAD REQUESTS CALL SO HE DISCUSS NEXT STEPS WITH XOCHITL HUMPHREYS    Phone Number Patient can be reached at: Other phone number:  [phone]    Best Time: ANYTIME    Can we leave a detailed message on this number? YES    Call taken on 4/26/2022 at 10:05 AM by Ayanna Fleming

## 2022-05-07 ENCOUNTER — HEALTH MAINTENANCE LETTER (OUTPATIENT)
Age: 22
End: 2022-05-07

## 2022-05-18 ENCOUNTER — VIRTUAL VISIT (OUTPATIENT)
Dept: INTERNAL MEDICINE | Facility: CLINIC | Age: 22
End: 2022-05-18
Payer: COMMERCIAL

## 2022-05-18 DIAGNOSIS — J02.9 SORE THROAT: Primary | ICD-10-CM

## 2022-05-18 PROCEDURE — 99213 OFFICE O/P EST LOW 20 MIN: CPT | Mod: 95 | Performed by: NURSE PRACTITIONER

## 2022-05-18 NOTE — PROGRESS NOTES
Pavithra is a 21 year old who is being evaluated via a billable video visit.      How would you like to obtain your AVS? Mail  If the video visit is dropped, the invitation should be resent by: Text to cell phone: [phone]  Will anyone else be joining your video visit? No    Video Start Time: 3:35 PM    Assessment & Plan   Problem List Items Addressed This Visit    None     Visit Diagnoses     Sore throat    -  Primary    Relevant Orders    Symptomatic; Yes; 5/18/2022 COVID-19 Virus (Coronavirus) by PCR    Streptococcus A Rapid Screen w/Reflex to PCR         - Rule out strep. He will be scheduled for a strep swab to confirm. Will do COVID testing as well. Reviewed the risk of antibiotic resistance if he does have strep but does not take the full course of antibiotics and strongly encouraged him to take the entire course if antibiotics are indicated.   - Return if symptoms worsen or fail to improve     Return in about 3 days (around 5/21/2022), or if symptoms worsen or fail to improve.    Magaly Forbes NP  [facility]   Pavithra is a 21 year old who presents for the following health issues    HPI     He has had a sore throat and body aches that started today. He denies cough, SOB, headache. He has not checked a temperature but does not feel feverish. No sick contacts. He has a history of recurrent strep by his report. He admits that he does not take the full course of antibiotics when he has strep.           Objective           Vitals:  No vitals were obtained today due to virtual visit.    Physical Exam   GENERAL: Healthy, alert and no distress  EYES: Eyes grossly normal to inspection.  No discharge or erythema, or obvious scleral/conjunctival abnormalities.  RESP: No audible wheeze, cough, or visible cyanosis.  No visible retractions or increased work of breathing.                Video-Visit Details    Type of service:  Video Visit    Video End Time:3:49 PM    Originating Location  (pt. Location): Home    Distant Location (provider location):  [facility]     Platform used for Video Visit: Zac

## 2022-05-19 ENCOUNTER — LAB (OUTPATIENT)
Dept: URGENT CARE | Facility: URGENT CARE | Age: 22
End: 2022-05-19
Attending: NURSE PRACTITIONER
Payer: COMMERCIAL

## 2022-05-19 DIAGNOSIS — J02.9 SORE THROAT: ICD-10-CM

## 2022-05-19 LAB
DEPRECATED S PYO AG THROAT QL EIA: NEGATIVE
GROUP A STREP BY PCR: NOT DETECTED

## 2022-05-19 PROCEDURE — U0005 INFEC AGEN DETEC AMPLI PROBE: HCPCS

## 2022-05-19 PROCEDURE — U0003 INFECTIOUS AGENT DETECTION BY NUCLEIC ACID (DNA OR RNA); SEVERE ACUTE RESPIRATORY SYNDROME CORONAVIRUS 2 (SARS-COV-2) (CORONAVIRUS DISEASE [COVID-19]), AMPLIFIED PROBE TECHNIQUE, MAKING USE OF HIGH THROUGHPUT TECHNOLOGIES AS DESCRIBED BY CMS-2020-01-R: HCPCS

## 2022-05-19 PROCEDURE — 87651 STREP A DNA AMP PROBE: CPT

## 2022-05-20 ENCOUNTER — TELEPHONE (OUTPATIENT)
Dept: NURSING | Facility: CLINIC | Age: 22
End: 2022-05-20
Payer: COMMERCIAL

## 2022-05-20 LAB — SARS-COV-2 RNA RESP QL NAA+PROBE: POSITIVE

## 2022-05-20 NOTE — TELEPHONE ENCOUNTER
Coronavirus (COVID-19) Notification    Caller Name (Patient, parent, daughter/son, grandparent, etc)  The patient      Reason for call  Notify of Positive Coronavirus (COVID-19) lab results, assess symptoms,  review [facility] recommendations    Lab Result    Lab test:  2019-nCoV rRt-PCR or SARS-CoV-2 PCR    Oropharyngeal AND/OR nasopharyngeal swabs is POSITIVE for 2019-nCoV RNA/SARS-COV-2 PCR (COVID-19 virus)      Gather patient reported symptoms   Assessment   Current Symptoms at time of phone call, reported by patient: (if no symptoms, document: No symptoms] Runny nose   Date of symptom(s) onset (if applicable) 3 days ago     If at time of call, Patients symptoms have worsened, the Patient should contact 911 or have someone drive them to Emergency Dept promptly:      If Patient calling 911, inform 911 personal that you have tested positive for the Coronavirus (COVID-19).  Place mask on and await 911 to arrive.    If Emergency Dept, If possible, please have another adult drive you to the Emergency Dept but you need to wear mask when in contact with other people.      Treatment Options:   Patient classified as COVID treatment eligible by Epic high risk algorithm: Yes  Is the patient symptomatic at the time of result notification? Yes. Was the onset of symptoms within the last 5 days? Yes.   There are now oral medications available for the treatment of COVID-19.  Taking one of these medications within the first five days of symptoms (when people may not yet feel severely ill) has been shown to make people feel better, prevent them from getting sicker, and preventing hospitalization and death.   Does the patient agree to have a visit with a provider to discuss treatment options? No.  Reason patient declined:  Worried about side effects from the drugs      Review information with Patient    Your result was positive. This means you have COVID-19 (coronavirus).    How can I protect others?    These guidelines are  for isolating before returning to work, school or .    If you DO have symptoms    Stay home and away from others     For at least 5 days after your symptoms started, AND    You are fever free for 24 hours (with no medicine that reduces fever), AND    Your other symptoms are better    Wear a mask for 10 full days anytime you are around others    If you DON'T have symptoms    Stay home and away from others for at least 5 days after your positive test    Wear a mask for 10 full days anytime you are around others    There may be different guidelines for healthcare facilities.  Please check with the specific sites before arriving.    If you have been told by a doctor that you were severely ill with COVID-19 or are immunocompromised, you should isolate for at least 10 days.    You should not go back to work until you meet the guidelines above for ending your home isolation. You don't need to be retested for COVID-19 before going back to work--studies show that you won't spread the virus if it's been at least 10 days since your symptoms started (or 20 days, if you have a weak immune system).    Employers, schools, and daycares: This is an official notice for this person's medical guidelines for returning in-person.  They must meet the above guidelines before going back to work, school or  in person.    You will receive a positive COVID-19 letter via [employer] or the mail soon with additional self-care information (exception, no letters will be sent to presurgical/preprocedure patients).    Would you like me to review some of that information with you now?  No    If you were tested for an upcoming procedure, please contact your provider for next steps.    Tisha Rivers LPN

## 2022-05-20 NOTE — TELEPHONE ENCOUNTER
Patient classified as COVID treatment eligible by Epic high risk algorithm:  Yes    Coronavirus (COVID-19) Notification    Reason for call  Notify of POSITIVE COVID-19 lab result, assess symptoms,  review [facility] recommendations    Lab Result   Lab test for 2019-nCoV rRt-PCR or SARS-COV-2 PCR  Oropharyngeal AND/OR nasopharyngeal swabs were POSITIVE for 2019-nCoV RNA [OR] SARS-COV-2 RNA (COVID-19) RNA     We have been unable to reach patient by phone at this time to notify of their Positive COVID-19 result.    Left voicemail message requesting a call back to [phone] [facility] for results.        A Positive COVID-19 letter will be sent via [email or web address] or the mail. (Exception, no letters sent to Presurgerical/Preprocedure Patients)    Brina Martini

## 2022-07-13 ENCOUNTER — TELEPHONE (OUTPATIENT)
Dept: FAMILY MEDICINE | Facility: CLINIC | Age: 22
End: 2022-07-13

## 2022-07-13 NOTE — TELEPHONE ENCOUNTER
"Pt states he has strep throat symptoms since Monday.   Sore throat, body fatigue then. Had headache then.   He states \"No idea if exposed to COVID or strep.\"     He states he cannot go back to work until he gets a Strep test .   Advised to have COVID test done too but he states he doesn't want to get this done.     He had appt at  to have both, but they didn't have orders for Strep and he didn't want to do the COVID test, so didn't go.     Pt states he would like to talk with someone at his current clinic at [facility].   Will route message there since they are on the phones currently.   Pt hung up the phone before this writer could relay any further message.                   "

## 2022-07-13 NOTE — TELEPHONE ENCOUNTER
"Attempt # 1    Called #   Telephone Information:   Mobile [phone]       \"The number you are dialing is not allowed to receive calls at this time\"    Zahra Steven RN, BSN  [facility]           "

## 2022-07-14 NOTE — TELEPHONE ENCOUNTER
Attempt # 2    Called # [phone] rang once then beeping like busy signal, then call ended.    Jihan Starks RN  [facility]

## 2023-01-07 ENCOUNTER — HEALTH MAINTENANCE LETTER (OUTPATIENT)
Age: 23
End: 2023-01-07

## 2023-04-04 ENCOUNTER — TELEPHONE (OUTPATIENT)
Dept: FAMILY MEDICINE | Facility: CLINIC | Age: 23
End: 2023-04-04
Payer: MEDICARE

## 2023-04-04 NOTE — TELEPHONE ENCOUNTER
We can do a video visit to discuss an emotional support animal/justify its need.  Alternatively, if he has a psych provider, they can do this for him too.      Michaela Hinton MBA, MS, PA-C  [facility]

## 2023-04-04 NOTE — TELEPHONE ENCOUNTER
Place call to patient to help schedule a VV with Michaela Hinton 4/19/2023. Patient does not have a psych provider.

## 2023-04-04 NOTE — TELEPHONE ENCOUNTER
Patient is calling to request an YOUSIF letter as soon as able. Patient is requesting letter to be uploaded to [employer] if unable patient can pick it up in clinic.

## 2023-04-19 ENCOUNTER — VIRTUAL VISIT (OUTPATIENT)
Dept: FAMILY MEDICINE | Facility: CLINIC | Age: 23
End: 2023-04-19
Payer: MEDICARE

## 2023-04-19 DIAGNOSIS — Z72.0 CURRENT EVERY DAY NICOTINE VAPING: ICD-10-CM

## 2023-04-19 DIAGNOSIS — F91.3 OPPOSITIONAL DEFIANT DISORDER: ICD-10-CM

## 2023-04-19 DIAGNOSIS — F90.2 ATTENTION DEFICIT HYPERACTIVITY DISORDER (ADHD), COMBINED TYPE: ICD-10-CM

## 2023-04-19 DIAGNOSIS — J45.990 EXERCISE-INDUCED ASTHMA: ICD-10-CM

## 2023-04-19 DIAGNOSIS — F41.1 GAD (GENERALIZED ANXIETY DISORDER): Primary | ICD-10-CM

## 2023-04-19 PROBLEM — S80.01XA CONTUSION OF RIGHT KNEE, INITIAL ENCOUNTER: Status: RESOLVED | Noted: 2019-08-19 | Resolved: 2023-04-19

## 2023-04-19 PROBLEM — M25.561 ACUTE PAIN OF RIGHT KNEE: Status: RESOLVED | Noted: 2019-08-21 | Resolved: 2023-04-19

## 2023-04-19 PROCEDURE — 99213 OFFICE O/P EST LOW 20 MIN: CPT | Mod: VID | Performed by: PHYSICIAN ASSISTANT

## 2023-04-19 ASSESSMENT — PATIENT HEALTH QUESTIONNAIRE - PHQ9
SUM OF ALL RESPONSES TO PHQ QUESTIONS 1-9: 13
10. IF YOU CHECKED OFF ANY PROBLEMS, HOW DIFFICULT HAVE THESE PROBLEMS MADE IT FOR YOU TO DO YOUR WORK, TAKE CARE OF THINGS AT HOME, OR GET ALONG WITH OTHER PEOPLE: SOMEWHAT DIFFICULT
SUM OF ALL RESPONSES TO PHQ QUESTIONS 1-9: 13

## 2023-04-19 ASSESSMENT — ANXIETY QUESTIONNAIRES
2. NOT BEING ABLE TO STOP OR CONTROL WORRYING: SEVERAL DAYS
7. FEELING AFRAID AS IF SOMETHING AWFUL MIGHT HAPPEN: SEVERAL DAYS
3. WORRYING TOO MUCH ABOUT DIFFERENT THINGS: SEVERAL DAYS
8. IF YOU CHECKED OFF ANY PROBLEMS, HOW DIFFICULT HAVE THESE MADE IT FOR YOU TO DO YOUR WORK, TAKE CARE OF THINGS AT HOME, OR GET ALONG WITH OTHER PEOPLE?: SOMEWHAT DIFFICULT
1. FEELING NERVOUS, ANXIOUS, OR ON EDGE: SEVERAL DAYS
6. BECOMING EASILY ANNOYED OR IRRITABLE: SEVERAL DAYS
GAD7 TOTAL SCORE: 7
4. TROUBLE RELAXING: SEVERAL DAYS
5. BEING SO RESTLESS THAT IT IS HARD TO SIT STILL: SEVERAL DAYS
7. FEELING AFRAID AS IF SOMETHING AWFUL MIGHT HAPPEN: SEVERAL DAYS
IF YOU CHECKED OFF ANY PROBLEMS ON THIS QUESTIONNAIRE, HOW DIFFICULT HAVE THESE PROBLEMS MADE IT FOR YOU TO DO YOUR WORK, TAKE CARE OF THINGS AT HOME, OR GET ALONG WITH OTHER PEOPLE: SOMEWHAT DIFFICULT

## 2023-04-19 ASSESSMENT — ASTHMA QUESTIONNAIRES
QUESTION_2 LAST FOUR WEEKS HOW OFTEN HAVE YOU HAD SHORTNESS OF BREATH: ONCE OR TWICE A WEEK
QUESTION_5 LAST FOUR WEEKS HOW WOULD YOU RATE YOUR ASTHMA CONTROL: WELL CONTROLLED
QUESTION_1 LAST FOUR WEEKS HOW MUCH OF THE TIME DID YOUR ASTHMA KEEP YOU FROM GETTING AS MUCH DONE AT WORK, SCHOOL OR AT HOME: A LITTLE OF THE TIME
ACT_TOTALSCORE: 20
QUESTION_4 LAST FOUR WEEKS HOW OFTEN HAVE YOU USED YOUR RESCUE INHALER OR NEBULIZER MEDICATION (SUCH AS ALBUTEROL): ONCE A WEEK OR LESS
QUESTION_3 LAST FOUR WEEKS HOW OFTEN DID YOUR ASTHMA SYMPTOMS (WHEEZING, COUGHING, SHORTNESS OF BREATH, CHEST TIGHTNESS OR PAIN) WAKE YOU UP AT NIGHT OR EARLIER THAN USUAL IN THE MORNING: ONCE OR TWICE
ACT_TOTALSCORE: 20

## 2023-04-19 NOTE — LETTER
[facility]  [address] [place] [address] [phone]       April 19, 2023    Pavithra Davenport  [address]  [place] [address]    To Whom it May Concern:      Mr. Pavithra Davenport is a patient who has been under my care since 2014.  Pavithra carries diagnoses including oppositional defiant disorder, attention deficit disorder, and generalized anxiety disorder. I am familiar with his history and with the functional limitations imposed by his mental health and emotional related diagnoses.  Due to this emotional disability, Pavithra has certain limitations of coping.  To help alleviate these challenges and to enhance his day-to-day functionality, I have prescribed Pavithra to obtain an emotional support animal.  The presence of this animal is necessary for the emotional and mental health of Mr. Arshad because its presence will likely mitigate the symptoms that he continues to experience.  I have not evaluated the animal in question, only the potential benefits that Pavithra may derive from them. Should you have any additional questions, please do not hesitate to contact me.    Sincerely,      Michaela Hinton MBA, MS, PA-C  Primary Care Physician Assistant  [facility] [place]  Phone [phone]  Fax [phone]

## 2023-04-19 NOTE — PATIENT INSTRUCTIONS
[facility] Mobile Crisis Response Services  (contact the county where the person is physically located at the time of the crisis)  *[place] (Child and Adult):    [phone]  *[place] (Child and Adult):    [phone]  *[place] (Child and Adult):    [phone]  *[place] (Child):    [phone]    (Adult):    [phone]  *[place] (Child):    [phone]   (Adult):    [phone]  *[place] (Child and Adult):    [phone]  *[place] (Child and Adult):    [phone]  Other Crisis Resources (non-mobile)  *[facility]):    [phone]    Walk-in and telephone crisis intervention services (focuses on >18 year-olds)    Located at [address] [address]  *Suicide Hotlines:    [phone]  or  0-577-IUBHOXG (392-6878)  or  9-129-083-TALK (6849)   Text Telephone:    1-220-027-2TTY (8111)   LGBT Youth Suicide Hotline:    9-372-4-U-AMANDA  *[facility] ( women and children):    [phone]  or  [phone]  *[facility] Crisis [facility] ( women and children):    [phone]       Short-term Residential Crisis Resources  *[facility] (ages 10-17):  [phone]     [address] [place] [address]   *[facility] (Birth - 6 years):    [phone]  *[facility] Crisis [facility] (Birth - 12 years):    [phone]       Inpatient Hospitalization and Residential Evaluation  *[facility] (Child and Adult)     [address] [place] [address]    Inpatient Intake [phone]    [facility]al [facility] Center:    [phone]    Day Treatment/Behavioral Intake:    [phone]  *[facility] (Adult):    [phone]

## 2023-04-19 NOTE — LETTER
My Asthma Action Plan    Name: Pavithra Davenport   YOB: 2000  Date: 4/19/2023   My doctor: Michaela Hinton PA-C   My clinic: [facility] [place]        My Rescue Medicine:   Albuterol inhaler (Proair/Ventolin/Proventil HFA)  2-4 puffs EVERY 4 HOURS as needed. Use a spacer if recommended by your provider.   My Asthma Severity:   Intermittent / Exercise Induced  Know your asthma triggers: exercise or sports             GREEN ZONE   Good Control    I feel good    No cough or wheeze    Can work, sleep and play without asthma symptoms       Take your asthma control medicine every day.     1. If exercise triggers your asthma, take your rescue medication    15 minutes before exercise or sports, and    During exercise if you have asthma symptoms  2. Spacer to use with inhaler: If you have a spacer, make sure to use it with your inhaler             YELLOW ZONE Getting Worse  I have ANY of these:    I do not feel good    Cough or wheeze    Chest feels tight    Wake up at night   1. Keep taking your Green Zone medications  2. Start taking your rescue medicine:    every 20 minutes for up to 1 hour. Then every 4 hours for 24-48 hours.  3. If you stay in the Yellow Zone for more than 12-24 hours, contact your doctor.  4. If you do not return to the Green Zone in 12-24 hours or you get worse, start taking your oral steroid medicine if prescribed by your provider.           RED ZONE Medical Alert - Get Help  I have ANY of these:    I feel awful    Medicine is not helping    Breathing getting harder    Trouble walking or talking    Nose opens wide to breathe       1. Take your rescue medicine NOW  2. If your provider has prescribed an oral steroid medicine, start taking it NOW  3. Call your doctor NOW  4. If you are still in the Red Zone after 20 minutes and you have not reached your doctor:    Take your rescue medicine again and    Call 911 or go to the emergency room right away    See your regular  doctor within 2 weeks of an Emergency Room or Urgent Care visit for follow-up treatment.          Annual Reminders:  Meet with Asthma Educator,  Flu Shot in the Fall, consider Pneumonia Vaccination for patients with asthma (aged 19 and older).    Pharmacy:    [id number] - [place] - [address] [address] [id number] - [place] - [address] [place] [address] & [facility] [place] - [address]    Electronically signed by Michaela Hinton PA-C   Date: 04/19/23                    Asthma Triggers  How To Control Things That Make Your Asthma Worse    Triggers are things that make your asthma worse.  Look at the list below to help you find your triggers and   what you can do about them. You can help prevent asthma flare-ups by staying away from your triggers.      Trigger                                                          What you can do   Cigarette Smoke  Tobacco smoke can make asthma worse. Do not allow smoking in your home, car or around you.  Be sure no one smokes at a child s day care or school.  If you smoke, ask your health care provider for ways to help you quit.  Ask family members to quit too.  Ask your health care provider for a referral to Quit Plan to help you quit smoking, or call 1-215-379-PLAN.     Colds, Flu, Bronchitis  These are common triggers of asthma. Wash your hands often.  Don t touch your eyes, nose or mouth.  Get a flu shot every year.     Dust Mites  These are tiny bugs that live in cloth or carpet. They are too small to see. Wash sheets and blankets in hot water every week.   Encase pillows and mattress in dust mite proof covers.  Avoid having carpet if you can. If you have carpet, vacuum weekly.   Use a dust mask and HEPA vacuum.   Pollen and Outdoor Mold  Some people are allergic to trees, grass, or weed pollen, or molds. Try to keep your windows closed.  Limit time out doors when pollen  count is high.   Ask you health care provider about taking medicine during allergy season.     Animal Dander  Some people are allergic to skin flakes, urine or saliva from pets with fur or feathers. Keep pets with fur or feathers out of your home.    If you can t keep the pet outdoors, then keep the pet out of your bedroom.  Keep the bedroom door closed.  Keep pets off cloth furniture and away from stuffed toys.     Mice, Rats, and Cockroaches  Some people are allergic to the waste from these pests.   Cover food and garbage.  Clean up spills and food crumbs.  Store grease in the refrigerator.   Keep food out of the bedroom.   Indoor Mold  This can be a trigger if your home has high moisture. Fix leaking faucets, pipes, or other sources of water.   Clean moldy surfaces.  Dehumidify basement if it is damp and smelly.   Smoke, Strong Odors, and Sprays  These can reduce air quality. Stay away from strong odors and sprays, such as perfume, powder, hair spray, paints, smoke incense, paint, cleaning products, candles and new carpet.   Exercise or Sports  Some people with asthma have this trigger. Be active!  Ask your doctor about taking medicine before sports or exercise to prevent symptoms.    Warm up for 5-10 minutes before and after sports or exercise.     Other Triggers of Asthma  Cold air:  Cover your nose and mouth with a scarf.  Sometimes laughing or crying can be a trigger.  Some medicines and food can trigger asthma.

## 2023-04-19 NOTE — PROGRESS NOTES
Pavithra is a 22 year old who is being evaluated via a billable video visit.      How would you like to obtain your AVS? MyChart  If the video visit is dropped, the invitation should be resent by: Text to cell phone: [phone]  Will anyone else be joining your video visit? No      Assessment & Plan     KEILA (generalized anxiety disorder)  ODD (oppositional defiant disorder)  Attention deficit hyperactivity disorder (ADHD), combined type  Patient not currently on any medication nor in psychotherapy.  He is does not desire either at this point in time.  Verbal contract for safety.  Reports that the companionship that his dog provides him is significant in reducing his anxiety and overall wellbeing.  Letter written for emotional support animal.  Patient understands that this is not a service animal authorization.  He is still responsible for training his dog and ensuring it is not at risk of harming people in public places or settings.         Depression Screening Follow Up        4/19/2023     3:03 PM   PHQ   PHQ-9 Total Score 13   Q9: Thoughts of better off dead/self-harm past 2 weeks Not at all         4/19/2023     3:03 PM   Last PHQ-9   1.  Little interest or pleasure in doing things 1   2.  Feeling down, depressed, or hopeless 0   3.  Trouble falling or staying asleep, or sleeping too much 3   4.  Feeling tired or having little energy 3   5.  Poor appetite or overeating 3   6.  Feeling bad about yourself 0   7.  Trouble concentrating 3   8.  Moving slowly or restless 0   Q9: Thoughts of better off dead/self-harm past 2 weeks 0   PHQ-9 Total Score 13       Follow Up Actions Taken  Crisis resource information provided in After Visit Summary         Michaela Hinton PA-C  [facility]      Patient lives in a [facility] and is wanting a letter for a emotional Support Animal.  Subjective   Pavithra is a 22 year old, presenting for the following health issues:  Emotional Support Aniimal         4/19/2023     2:58 PM   Additional Questions   Roomed by Angy Bauer MYRA   Accompanied by Self         9/14/2018     1:00 PM 4/19/2023     3:03 PM   PHQ   PHQ-9 Total Score 0 13   Q9: Thoughts of better off dead/self-harm past 2 weeks Not at all Not at all          4/19/2023     3:04 PM   KEILA-7 SCORE   Total Score 7 (mild anxiety)   Total Score 7       HPI           Review of Systems   Constitutional, HEENT, cardiovascular, pulmonary, GI, , musculoskeletal, neuro, skin, endocrine and psych systems are negative, except as otherwise noted.      Objective           Vitals:  No vitals were obtained today due to virtual visit.    Physical Exam   GENERAL: Healthy, alert and no distress  EYES: Eyes grossly normal to inspection.  No discharge or erythema, or obvious scleral/conjunctival abnormalities.  RESP: No audible wheeze, cough, or visible cyanosis.  No visible retractions or increased work of breathing.    SKIN: Visible skin clear. No significant rash, abnormal pigmentation or lesions.  NEURO: Cranial nerves grossly intact.  Mentation and speech appropriate for age.  PSYCH: Mentation appears normal, affect normal/bright, judgement and insight intact, normal speech and appearance well-groomed.                Video-Visit Details    Type of service:  Video Visit   Video Start Time: 3:11 PM  Video End Time:3:22 PM    Originating Location (pt. Location): Home  Distant Location (provider location):  On-site  Platform used for Video Visit: Zac

## 2023-04-20 ENCOUNTER — HOSPITAL ENCOUNTER (EMERGENCY)
Facility: CLINIC | Age: 23
Discharge: HOME | End: 2023-04-20
Attending: EMERGENCY MEDICINE | Admitting: EMERGENCY MEDICINE
Payer: MEDICARE

## 2023-04-20 VITALS
DIASTOLIC BLOOD PRESSURE: 80 MMHG | OXYGEN SATURATION: 100 % | TEMPERATURE: 98 F | RESPIRATION RATE: 16 BRPM | SYSTOLIC BLOOD PRESSURE: 148 MMHG | HEART RATE: 82 BPM

## 2023-04-20 DIAGNOSIS — N48.9 PENILE LESION: ICD-10-CM

## 2023-04-20 LAB
ALBUMIN UR-MCNC: NEGATIVE MG/DL
APPEARANCE UR: CLEAR
BILIRUB UR QL STRIP: NEGATIVE
C TRACH DNA SPEC QL NAA+PROBE: NEGATIVE
COLOR UR AUTO: NORMAL
GLUCOSE UR STRIP-MCNC: NEGATIVE MG/DL
HGB UR QL STRIP: NEGATIVE
KETONES UR STRIP-MCNC: NEGATIVE MG/DL
LEUKOCYTE ESTERASE UR QL STRIP: NEGATIVE
N GONORRHOEA DNA SPEC QL NAA+PROBE: NEGATIVE
NITRATE UR QL: NEGATIVE
PH UR STRIP: 6.5 [PH] (ref 5–7)
RBC URINE: <1 /HPF
SP GR UR STRIP: 1.02 (ref 1–1.03)
UROBILINOGEN UR STRIP-MCNC: NORMAL MG/DL
WBC URINE: <1 /HPF

## 2023-04-20 PROCEDURE — 87252 VIRUS INOCULATION TISSUE: CPT | Performed by: EMERGENCY MEDICINE

## 2023-04-20 PROCEDURE — 87529 HSV DNA AMP PROBE: CPT | Performed by: EMERGENCY MEDICINE

## 2023-04-20 PROCEDURE — 87491 CHLMYD TRACH DNA AMP PROBE: CPT | Performed by: EMERGENCY MEDICINE

## 2023-04-20 PROCEDURE — 81001 URINALYSIS AUTO W/SCOPE: CPT | Performed by: EMERGENCY MEDICINE

## 2023-04-20 PROCEDURE — 87591 N.GONORRHOEAE DNA AMP PROB: CPT | Performed by: EMERGENCY MEDICINE

## 2023-04-20 PROCEDURE — 99283 EMERGENCY DEPT VISIT LOW MDM: CPT

## 2023-04-20 NOTE — DISCHARGE INSTRUCTIONS
The sore or pimple on your penis is likely nothing significant.  We are checking it however for herpes infection and we will call you if further treatment is needed.  We are also checking you for gonorrhea and chlamydia although this appears less likely as well.  There is no evidence of a urinary tract infection.  Our nurse will call you if any antibiotics are needed.

## 2023-04-20 NOTE — ED PROVIDER NOTES
History     Chief Complaint:  Exposure to STD       HPI   Pavithra Davenport is a 22 year old male with a history of intercourse approximately 2 months ago.  He saw a physician about 1 month ago as he weeks is concerned about the possibility of sexually transmitted infection.  There were no concerning findings per the patient's report.  He was told to return if any bumps appeared on the penis or if there were other symptoms that develop.  The patient noted a 1 mm white follicular pustule on the right side of the penile shaft this morning and became concerned.  There is no other lesions.  There is nothing that significantly painful to the penis or the glans and there is no urethral discharge.  There is no testicular or scrotal pain.  No pain in the groin.      Independent Historian:   None - Patient Only    Review of External Notes:      ROS:  Review of Systems  No abdominal pain nausea or vomiting.  No shortness of breath.  No dysuria hematuria frequency or urgency.    Allergies:  No Known Allergies     Medications:    albuterol (PROAIR HFA/PROVENTIL HFA/VENTOLIN HFA) 108 (90 Base) MCG/ACT inhaler  triamcinolone (KENALOG) 0.1 % external cream        Past Medical History:    Past Medical History:   Diagnosis Date     ADHD (attention deficit hyperactivity disorder) 09/17/2008     ODD (oppositional defiant disorder)        Past Surgical History:    Past Surgical History:   Procedure Laterality Date     NO HISTORY OF SURGERY          Family History:    family history includes Asthma in his father and mother; Diabetes in his maternal grandmother; Fibromyalgia in his mother; Hepatitis in his maternal grandfather; Hyperlipidemia in his mother; Hypertension in his maternal grandfather, maternal grandmother, mother, and paternal grandmother; Psoriatic Arthritis in his father; Rheumatic fever in his maternal grandfather.    Social History:   reports that he has never smoked. He has never used smokeless tobacco. He reports  current alcohol use of about 8.0 standard drinks of alcohol per week. He reports that he does not use drugs.  PCP: Michaela Hinton     Physical Exam     Patient Vitals for the past 24 hrs:   BP Temp Temp src Pulse Resp SpO2   04/20/23 0937 (!) 148/80 98  F (36.7  C) Temporal 82 16 100 %        Physical Exam  Patient is resting comfortably.  Eyes are normal  Abdomen is nontender and nonfocal benign and nonsurgical  The penis is normal, circumcised.  There is a 1 mm follicular pimple type pustule to the right side of the penile shaft.  No other lesions are identified.  There are no vesicles.  There are no grouped vesicles suggestive of herpes.  There is no evidence of a chancre.  The testicles are normal.  Epididymis is normal.  The inguinal area demonstrates no significant lymphadenopathy.      Emergency Department Course     Laboratory:  Labs Ordered and Resulted from Time of ED Arrival to Time of ED Departure   ROUTINE UA WITH MICROSCOPIC REFLEX TO CULTURE - Normal       Result Value    Color Urine Light Yellow      Appearance Urine Clear      Glucose Urine Negative      Bilirubin Urine Negative      Ketones Urine Negative      Specific Gravity Urine 1.017      Blood Urine Negative      pH Urine 6.5      Protein Albumin Urine Negative      Urobilinogen Urine Normal      Nitrite Urine Negative      Leukocyte Esterase Urine Negative      RBC Urine <1      WBC Urine <1     CHLAMYDIA TRACHOMATIS PCR   NEISSERIA GONORRHOEAE PCR   VIRAL CULTURE NON-RESPIRATORY   HERPES SIMPLEX VIRUS 1&2 PCR        Procedures     Emergency Department Course & Assessments:           Disposition:  The patient was discharged to home.     Impression & Plan    CMS Diagnoses: None      Medical Decision Making:  This patient presents to the emergency department concerned about the possibility of an STI.  He has no urethral discharge or urethral symptoms.  His urinalysis is negative.  No testicular or scrotal symptoms.  No evidence of  clinical epididymitis or orchitis.  No lymphadenopathy.  There is a 1 mm pimple on the right side of the penile shaft.  The differential diagnosis includes simply a pimple or a small pustule that was easily unroofed.  There is no vesicular lesions.  The differential diagnosis includes a simple pimple such as a folliculitis or the possibility of an early herpetic infection.  Syphilis would be unlikely.  This does not appear herpetic to me.  There is no urethral discharge to suspect gonorrhea, chlamydia or other etiologies.  Patient was tested with PCR for gonorrhea chlamydia and herpes.  A viral culture is also sent from the 1 lesion that is present.  The culture nurse can call the patient if any of these come back positive.  No empiric treatment is started at this time given the benign appearance.      Diagnosis:    ICD-10-CM    1. Penile lesion  N48.9            Discharge Medications:  Discharge Medication List as of 4/20/2023 12:30 PM             Tirso Alcantar MD  4/20/2023   No att. providers found        Tirso Alcantar MD  04/20/23 3975

## 2023-04-21 LAB
HSV1 DNA SPEC QL NAA+PROBE: NOT DETECTED
HSV2 DNA SPEC QL NAA+PROBE: NOT DETECTED

## 2023-05-03 NOTE — RESULT ENCOUNTER NOTE
Final Viral culture is NEGATIVE.    No treatment is recommended per [facility] ED Lab Result culture protocol.

## 2023-06-02 ENCOUNTER — HEALTH MAINTENANCE LETTER (OUTPATIENT)
Age: 23
End: 2023-06-02

## 2023-07-10 ENCOUNTER — VIRTUAL VISIT (OUTPATIENT)
Dept: FAMILY MEDICINE | Facility: CLINIC | Age: 23
End: 2023-07-10
Payer: MEDICARE

## 2023-07-10 DIAGNOSIS — Z72.0 CURRENT EVERY DAY NICOTINE VAPING: Primary | ICD-10-CM

## 2023-07-10 PROCEDURE — 99213 OFFICE O/P EST LOW 20 MIN: CPT | Mod: VID | Performed by: PHYSICIAN ASSISTANT

## 2023-07-10 RX ORDER — ACETAMINOPHEN 500 MG
500 TABLET ORAL
COMMUNITY
Start: 2023-07-08

## 2023-07-10 RX ORDER — IBUPROFEN 600 MG/1
600 TABLET, FILM COATED ORAL
COMMUNITY
Start: 2023-07-08
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
## 2023-07-15 ENCOUNTER — HEALTH MAINTENANCE LETTER (OUTPATIENT)
Age: 23
End: 2023-07-15

## 2023-07-16 ENCOUNTER — DOCUMENTATION ONLY (OUTPATIENT)
Dept: OTHER | Facility: CLINIC | Age: 23
End: 2023-07-16
Payer: MEDICARE

## 2024-02-13 ENCOUNTER — MYC REFILL (OUTPATIENT)
Dept: FAMILY MEDICINE | Facility: CLINIC | Age: 24
End: 2024-02-13
Payer: MEDICARE

## 2024-02-13 DIAGNOSIS — L30.9 ECZEMA, UNSPECIFIED TYPE: ICD-10-CM

## 2024-02-14 RX ORDER — TRIAMCINOLONE ACETONIDE 1 MG/G
CREAM TOPICAL
Qty: 30 G | Refills: 2 | Status: SHIPPED | OUTPATIENT
Start: 2024-02-14

## 2024-02-16 ENCOUNTER — HOSPITAL ENCOUNTER (EMERGENCY)
Facility: CLINIC | Age: 24
Discharge: HOME | End: 2024-02-17
Attending: EMERGENCY MEDICINE | Admitting: EMERGENCY MEDICINE
Payer: MEDICARE

## 2024-02-16 VITALS
OXYGEN SATURATION: 100 % | TEMPERATURE: 97.3 F | DIASTOLIC BLOOD PRESSURE: 78 MMHG | HEART RATE: 78 BPM | RESPIRATION RATE: 18 BRPM | SYSTOLIC BLOOD PRESSURE: 154 MMHG

## 2024-02-16 DIAGNOSIS — R21 SCROTAL RASH: ICD-10-CM

## 2024-02-16 PROCEDURE — 87491 CHLMYD TRACH DNA AMP PROBE: CPT | Performed by: EMERGENCY MEDICINE

## 2024-02-16 PROCEDURE — 99283 EMERGENCY DEPT VISIT LOW MDM: CPT

## 2024-02-16 ASSESSMENT — ACTIVITIES OF DAILY LIVING (ADL)
ADLS_ACUITY_SCORE: 33
ADLS_ACUITY_SCORE: 33

## 2024-02-17 LAB
C TRACH DNA SPEC QL PROBE+SIG AMP: NEGATIVE
N GONORRHOEA DNA SPEC QL NAA+PROBE: NEGATIVE

## 2024-02-17 ASSESSMENT — ACTIVITIES OF DAILY LIVING (ADL)
ADLS_ACUITY_SCORE: 33

## 2024-02-17 NOTE — ED TRIAGE NOTES
Pt arrives with c/o possible herpes infection. Pt reports he began having itching to his scrotum about 4 days ago and then small bumps appears about 3 days ago. Pt denies any penile discharge.      Triage Assessment (Adult)       Row Name 02/16/24 1825          Triage Assessment    Airway WDL WDL        Respiratory WDL    Respiratory WDL WDL        Skin Circulation/Temperature WDL    Skin Circulation/Temperature WDL WDL        Cardiac WDL    Cardiac WDL WDL        Peripheral/Neurovascular WDL    Peripheral Neurovascular WDL WDL        Cognitive/Neuro/Behavioral WDL    Cognitive/Neuro/Behavioral WDL WDL

## 2024-02-17 NOTE — ED PROVIDER NOTES
History     Chief Complaint:  STD Testing and Wound Check       The history is provided by the patient.      Pavithra Davenport is a 23 year old male who presents to the ED for a wound check on his testicles. Reports itching approximately 4 days ago and noticed bumps on both testicles 3 days ago. Denies pain, penile draining or dysuria. Reports being sexually active. Reports one partner since a year ago. Reports history of eczema. Denies any other medical problems.     Independent Historian:   None - Patient Only    Review of External Notes:   None    Medications:    The patient is currently on no regular medications.    Past Medical History:    ADHD  ODD (oppositional defiant disorder)    Physical Exam   Patient Vitals for the past 24 hrs:   BP Temp Temp src Pulse Resp SpO2   24 1825 (!) 154/78 97.3  F (36.3  C) Temporal 78 18 100 %      Physical Exam  General: Laying on the ED bed  HEENT: Normocephalic, atraumatic  Cardiac: Warm and well perfused  Pulm: Breathing comfortably, no accessory muscle usage, no conversational dyspnea  : Small raised macular rash over the scrotum worse on the right compared to left.  No umbilication, no ulceration, nonpainful, no erythema, no penile discharge.  MSK: No bony deformities  Skin: Warm and dry  Neuro: Moves all extremities  Psych: Pleasant mood and affect      Emergency Department Course        Procedures   None    Emergency Department Course & Assessments:       Interventions:  Medications - No data to display     Assessments:   I obtained history and examined the patient as noted above.   I rechecked and updated the patient.      Independent Interpretation (X-rays, CTs, rhythm strip):  None    Consultations/Discussion of Management or Tests:  None        Social Determinants of Health affecting care:   Healthcare Access/Compliance    Disposition:  The patient was discharged.     Impression & Plan    CMS Diagnoses: None      Medical Decision Makin-year-old  male presents with scrotal rash that is pruritic in nature.  He has no new sexual partners, states that he is in a monogamous relationship.  The rash is not painful, not open, not vesicular, lower suspicion for herpes infection.  Herpes swabs were sent for further evaluation.  He has no penile discharge, lower suspicion for chlamydia/gonorrhea.  No indication for treatment of either of those etiologies at this stage, but rather seems reasonable to wait for results since the history and exam do not line up completely with either.  Differential still includes eczema which the patient has a history of, as well as tinea cruris though a limited distribution for that.  Plan is for discharge home with PCP follow-up for further care which the patient is agreeable to as well.  He expressed understanding of the testing obtained above as well.      Diagnosis:    ICD-10-CM    1. Scrotal rash  R21 CANCELED: Herpes Simplex Virus 1&2 by PCR (other site, no default specimen type/source)             Scribe Disclosure:  I, Tashia Mata, am serving as a scribe at 8:07 PM on 2/16/2024 to document services personally performed by Nick Cohen MD based on my observations and the provider's statements to me.   2/16/2024   Nick Cohen MD King, Colin, MD  02/16/24 5074

## 2024-07-11 ENCOUNTER — HOSPITAL ENCOUNTER (EMERGENCY)
Facility: CLINIC | Age: 24
Discharge: HOME | End: 2024-07-11
Attending: EMERGENCY MEDICINE | Admitting: EMERGENCY MEDICINE
Payer: MEDICARE

## 2024-07-11 VITALS
HEART RATE: 93 BPM | BODY MASS INDEX: 27.68 KG/M2 | SYSTOLIC BLOOD PRESSURE: 132 MMHG | OXYGEN SATURATION: 97 % | DIASTOLIC BLOOD PRESSURE: 67 MMHG | TEMPERATURE: 98.6 F | WEIGHT: 239.2 LBS | RESPIRATION RATE: 18 BRPM | HEIGHT: 78 IN

## 2024-07-11 DIAGNOSIS — J02.0 STREP PHARYNGITIS: ICD-10-CM

## 2024-07-11 DIAGNOSIS — U07.1 COVID-19: ICD-10-CM

## 2024-07-11 LAB
FLUAV RNA SPEC QL NAA+PROBE: NEGATIVE
FLUBV RNA RESP QL NAA+PROBE: NEGATIVE
GROUP A STREP BY PCR: DETECTED
RSV RNA SPEC NAA+PROBE: NEGATIVE
SARS-COV-2 RNA RESP QL NAA+PROBE: POSITIVE

## 2024-07-11 PROCEDURE — 99283 EMERGENCY DEPT VISIT LOW MDM: CPT

## 2024-07-11 PROCEDURE — 87651 STREP A DNA AMP PROBE: CPT | Performed by: EMERGENCY MEDICINE

## 2024-07-11 PROCEDURE — 250N000013 HC RX MED GY IP 250 OP 250 PS 637: Performed by: EMERGENCY MEDICINE

## 2024-07-11 PROCEDURE — 87637 SARSCOV2&INF A&B&RSV AMP PRB: CPT | Performed by: EMERGENCY MEDICINE

## 2024-07-11 RX ORDER — AMOXICILLIN 875 MG
875 TABLET ORAL 2 TIMES DAILY
Qty: 20 TABLET | Refills: 0 | Status: SHIPPED | OUTPATIENT
Start: 2024-07-11 | End: 2024-07-21

## 2024-07-11 RX ADMIN — Medication 10 MG: at 12:23

## 2024-07-11 ASSESSMENT — COLUMBIA-SUICIDE SEVERITY RATING SCALE - C-SSRS
1. IN THE PAST MONTH, HAVE YOU WISHED YOU WERE DEAD OR WISHED YOU COULD GO TO SLEEP AND NOT WAKE UP?: NO
6. HAVE YOU EVER DONE ANYTHING, STARTED TO DO ANYTHING, OR PREPARED TO DO ANYTHING TO END YOUR LIFE?: NO
2. HAVE YOU ACTUALLY HAD ANY THOUGHTS OF KILLING YOURSELF IN THE PAST MONTH?: NO

## 2024-07-11 ASSESSMENT — ACTIVITIES OF DAILY LIVING (ADL): ADLS_ACUITY_SCORE: 33

## 2024-07-11 NOTE — DISCHARGE INSTRUCTIONS
Discharge Instructions  COVID-19    COVID-19 is the disease caused by a new coronavirus. The virus spreads from person-to-person primarily by droplets when an infected person coughs or sneezes and the droplets are then breathed in by another person.    Symptoms of COVID-19  Many people have no symptoms or mild symptoms.  Symptoms usually appear within a few days, but up to 14-days, after contact with a person with COVID-19.    A mild COVID-19 illness is like a cold and can have fever, cough, sneezing, sore throat, tiredness, headache, and muscle pain.    A moderate COVID-19 illness might include shortness of breath or pneumonia on a chest x-ray.    A severe COVID-19 illness causes significant breathing problems such as low oxygen levels or more serious pneumonia.  Some patients experience loss of taste or smell which is somewhat unique to COVID-19.      Isolation and Quarantine  Testing is recommended for any person with symptoms that could be COVID-19 and often for those exposed to COVID-19. The best way to stop the spread of the virus is to avoid contact with others.    A close contact exposure is being within 6 feet of someone with COVID for 15 minutes.    Isolation refers to sick people staying away from people who are not sick.    A person in quarantine is limiting activity because they were exposed and are waiting to see if they might become sick.    If you test positive for COVID and have no symptoms, you should stay home (isolation) for 5 full days after the day of the test. You should then wear a mask when around others for another 5 days.    If you test positive for COVID and have mild symptoms, you should stay home (isolation) for at least 5 days after your symptoms began. You can return to normal activities at that time, wearing a mask when around others, for another 5 days as long as your symptoms are improving/resolving and you have been without a fever for 24 hours (without using fever-reducing  medicine).    If you test positive for COVID and have more than mild symptoms, you should stay home (isolation) for at least 10 days after your symptoms began. You can return to normal activities at that time as long as your symptoms are improving and you have been without a fever for 24 hours (without using fever-reducing medicine).  For example, if you have a fever and cough for 6 days, you need to stay home 4 more days with no fever for a total of 10 days. Or, if you have a fever and cough for 10 days, you need to stay home one more day with no fever for a total of 11 days.    If you were exposed to COVID and are not vaccinated (or it has been more than six months from your Pfizer or Moderna vaccine or two months from J&J vaccine), you should stay home (quarantine) for 5 days and then wear a mask around others for 5 additional days. A COVID test at day 5 is recommended.    If you were exposed to COVID and are vaccinated (had a booster, had two shots of Pfizer or Moderna vaccine in the last five months, or had J&J vaccine within two months), you do not need to quarantine but should wear a mask around others for 10 days and get a COVID test on day 5.    If you have symptoms but a negative test, you should stay at home until you have mild/improving symptoms and are without fever for 24 hours, using the same judgment you would for when it is safe to return to work/school from strep throat, influenza, or the common cold. If you worsen, you should consider being re-evaluated.    If you are being tested for COVID because of symptoms and your test is pending, you should stay home until you know your test result.  More details on isolation and quarantine can be found on this website from the CDC:  https://www.cdc.gov/coronavirus/2019-ncov/your-health/quarantine-isolation.html    If I have COVID, how should I protect myself and others?    Do not go to work or school. Have a friend or relative do your shopping. Do not use  public transportation (bus, train) or ridesharing (Lyft, Uber).    Separate yourself from other people in your home. As much as possible, you should stay in one room and away from other people in your home. Also, use a separate bathroom, if possible. Avoid handling pets or other animals while sick.     Wear a facemask if you need to be around other people and cover your mouth and nose with a tissue when you cough or sneeze.     Avoid sharing personal household items. You should not share dishes, drinking glasses, forks/knives/spoons, towels, or bedding with other people in your home. After using these items, they should be washed with soap and water. Clean parts of your home that are touched often (doorknobs, faucets, countertops, etc.) daily.     Wash your hands often with soap and water for at least 20 seconds or use an alcohol-based hand  containing at least 60% alcohol.     Avoid touching your face.    Treat your symptoms. You can take Acetaminophen (Tylenol) to treat body aches and fever as needed for comfort. Ibuprofen (Advil or Motrin) can be used as well if you still have symptoms after taking Tylenol. Drink fluids. Rest.    Watch for worsening symptoms such as shortness of breath/difficulty breathing or very severe weakness.    Employers/workplaces are being asked by the Centers for Disease Control (CDC) to not request notes/documentation for you to return to work or prove that you were ill. You may choose to show your employer this paperwork. Also, repeat testing should not be required to return to work.    Exercise/Sports in rare cases, COVID could affect your heart in a way that makes exercise or participation in sports dangerous.  If you have a mild COVID illness (fever, cough, sore throat, and similar symptoms but no difficulty breathing or abnormalities of the lung): After your COVID symptoms have resolved, wait 14-days before returning to activity.  If you have more than a mild illness  (meaning that you have problems with your breathing or lungs) or if you participate in competitive or strenuous activity or have a history of heart disease: Please see your primary doctor/provider prior to return to activity/competition.    COVID treatments such as antiviral and antibody medications are available. They are recommended for those patients who have a risk for developing more severe COVID illness. Importantly, the treatments must be started early in the illness (within 5-7 days, depending on which treatment). These treatments may have been considered today during your visit. If you have other questions, contact your primary doctor/clinic.     You can learn more about COVID treatments from the [facility]:  https://www.health.[facility]./diseases/coronavirus/meds.html    Return to the Emergency Department if:    If you are developing worsening breathing, shortness of breath, or feel worse you should seek medical attention.  If you are uncertain, contact your health care provider/clinic. If you need emergency medical attention, call 911 and tell them you have been ill.   Discharge Instructions  Sore Throat  You were seen today for a sore throat, in medical terms this is called pharyngitis. A sore throat is most often caused by viral or bacterial infections; most of which (>80%) are caused by a virus. Viral infections are not treated with antibiotics, treatment for a viral sore throat consists mostly of treating symptoms (such as pain and fever) with over-the-counter pain relievers/fever reducers.  Strep throat is a kind of sore throat caused by Group A streptococcus bacteria.  This type of sore throat is treated with antibiotics so we test for this with a  strep test  and, if positive, prescribe antibiotics.  Generally, every Emergency Department visit should have a follow-up clinic visit with either a primary or a specialty clinic/provider. Please follow-up as instructed by your  emergency provider today.  Return to the Emergency Department if:  If you have difficulty breathing.  If you are drooling because you are unable to swallow.  You become dehydrated due to difficulty drinking. Signs of dehydration include weakness, dry mouth, and urinating less than 3 times per day.  If you develop swelling of the neck or tongue.  If you develop a high fever with either severe or unusual headache or stiff neck.    Treatment:    Pain relief -- Non-prescription pain medications, such as Tylenol  (acetaminophen) or Motrin , Advil  (ibuprofen) are usually recommended for pain.  Do not use a medicine that you are allergic to, or if your provider has told you not to use it.  Soft or liquid diet. Concentrate on liquids to keep yourself hydrated. Cold liquids (popsicles, ice cream, etc.) may feel good on your throat.  If you were given a prescription for medicine here today, be sure to read all of the information (including the package insert) that comes with your prescription.  This will include important information about the medicine, its side effects, and any warnings that you need to know about.  The pharmacist who fills the prescription can provide more information and answer questions you may have about the medicine.  If you have questions or concerns that the pharmacist cannot address, please call or return to the Emergency Department.   Remember that you can always come back to the Emergency Department if you are not able to see your regular provider in the amount of time listed above, if you get any new symptoms, or if there is anything that worries you.

## 2024-07-11 NOTE — ED PROVIDER NOTES
"  Emergency Department Note      History of Present Illness     Chief Complaint   Pharyngitis      HPI   Pavithra Davenport is a 23 year old male who presents to the emergency department for evaluation of pharyngitis. The patient reports that last night he began to have a sore throat and body fatigue. He states that before these symptoms, he noticed some swelling and pain near his nose piercing. He denies any ear pain, fever, or cough.    Independent Historian   None    Review of External Notes   None    Past Medical History     Medical History and Problem List   ADHD  ODD  Pyromania  Eczema  Exercise induced Asthma  Anxiety    Medications   Albuterol  Nicotine polacrilex  Kenalog    Surgical History   No surgical history.    Physical Exam     Patient Vitals for the past 24 hrs:   BP Temp Temp src Pulse Resp SpO2 Height Weight   07/11/24 1105 138/85 -- -- -- -- -- -- --   07/11/24 1103 -- 98.6  F (37  C) Temporal 74 16 100 % 1.981 m (6' 6\") 108.5 kg (239 lb 3.2 oz)     Physical Exam  General: Well-nourished, non toxic in appearance.  Eyes: PERRL, conjunctivae pink no scleral icterus or conjunctival injection  ENT:  Moist mucus membranes.  No tonsillar exudates.  Bilateral tonsillar erythema.  Uvula is midline.  No sign of peritonsillar abscess.  Ears: Tympanic membranes are intact.  No redness or swelling.   Respiratory:  Lungs clear to auscultation bilaterally, no crackles/rubs/wheezes.  Good air movement  CV: Normal rate and rhythm, no murmurs  GI:  Abdomen soft and non-distended.  No tenderness, guarding or rebound  Skin: Warm, dry.  No rashes or petechiae  Musculoskeletal: No peripheral edema or calf tenderness  Neuro: Alert and oriented to person/place/time  Psychiatric: Normal affect      Diagnostics     Lab Results   Labs Ordered and Resulted from Time of ED Arrival to Time of ED Departure   INFLUENZA A/B, RSV, & SARS-COV2 PCR - Abnormal       Result Value    Influenza A PCR Negative      Influenza B PCR Negative "      RSV PCR Negative      SARS CoV2 PCR Positive (*)    GROUP A STREPTOCOCCUS PCR THROAT SWAB - Abnormal    Group A strep by PCR Detected (*)        Imaging   No orders to display       EKG   None    Independent Interpretation   None    ED Course      Medications Administered   Medications   dexAMETHasone (DECADRON) alcohol-free oral solution 10 mg (10 mg Oral $Given 7/11/24 1223)       Procedures   None     Discussion of Management   None    ED Course   ED Course as of 07/11/24 1227   Thu Jul 11, 2024   1118 I initially assessed the patient and obtained the above history and physical exam.         Optional/Additional Documentation  None    Medical Decision Making / Diagnosis     CMS Diagnoses: None    MIPS       None    MDM   Pavithra Davenport is a 23 year old male with a history of asthma presents to the emergency department with a complaint of a sore throat.  On exam patient is well-appearing, no signs of peritonsillar abscess or retropharyngeal abscess.  He has not had any fevers, or any difficulty eating or drinking.  No neck pain, and patient is moving his neck around freely.  No signs of meningitis.  Strep swab is positive, he also is COVID-positive.  Patient is given a dose of Decadron and prescribed antibiotics.  He is advised to for symptomatic care for his COVID.  Patient is discharged home.    Disposition   The patient was discharged.     Diagnosis     ICD-10-CM    1. Strep pharyngitis  J02.0            Discharge Medications   New Prescriptions    AMOXICILLIN (AMOXIL) 875 MG TABLET    Take 1 tablet (875 mg) by mouth 2 times daily for 10 days         Scribe Disclosure:  Ross ACUNA, am serving as a scribe at 11:24 AM on 7/11/2024 to document services personally performed by Lashonda Baker MD based on my observations and the provider's statements to me.        Lashonda Baker MD  07/11/24 [id number]

## 2024-07-12 ENCOUNTER — PATIENT OUTREACH (OUTPATIENT)
Dept: FAMILY MEDICINE | Facility: CLINIC | Age: 24
End: 2024-07-12
Payer: MEDICARE

## 2024-07-12 NOTE — TELEPHONE ENCOUNTER
Attempt # 1    Called # [phone]     Left a non detailed VM to call back at [phone] and ask for any available Triage Nurse.    Lorena Lincoln RN  [facility]

## 2024-07-16 NOTE — TELEPHONE ENCOUNTER
Transitions of Care Outreach  Chief Complaint   Patient presents with    Hospital F/U       Most Recent Admission Date: 7/11/2024   Most Recent Admission Diagnosis:      Most Recent Discharge Date: 7/11/2024   Most Recent Discharge Diagnosis: Strep pharyngitis - J02.0  COVID-19 - U07.1     Transitions of Care Assessment    Discharge Assessment  How are you doing now that you are home?: feeling ok, didn't feel sick  How are your symptoms? (Red Flag symptoms escalate to triage hotline per guidelines): Improved  Do you know how to contact your clinic care team if you have future questions or changes to your health status? : Yes  Does the patient have their discharge instructions? : Yes  Does the patient have questions regarding their discharge instructions? : No  Were you started on any new medications or were there changes to any of your previous medications? : Yes  Does the patient have all of their medications?: Yes  Do you have questions regarding any of your medications? : Yes (see comment)  Do you have all of your needed medical supplies or equipment (DME)?  (i.e. oxygen tank, CPAP, cane, etc.): Yes    Follow up Plan     Discharge Follow-Up  Discharge follow up appointment scheduled in alignment with recommended follow up timeframe or Transitions of Risk Category? (Low = within 30 days; Moderate= within 14 days; High= within 7 days): No  Patient's follow up appointment not scheduled: Patient declined scheduling support. Education on the importance of transitions of care follow up. Provided scheduling phone number.    No future appointments.    Outpatient Plan as outlined on AVS reviewed with patient.    For any urgent concerns, please contact our 24 hour nurse triage line: [phone] ([phone])       JIMY HI RN

## 2024-08-22 ENCOUNTER — HOSPITAL ENCOUNTER (EMERGENCY)
Facility: CLINIC | Age: 24
End: 2024-08-22
Payer: MEDICARE

## 2024-09-07 ENCOUNTER — HEALTH MAINTENANCE LETTER (OUTPATIENT)
Age: 24
End: 2024-09-07

## 2024-09-25 ENCOUNTER — TELEPHONE (OUTPATIENT)
Dept: FAMILY MEDICINE | Facility: CLINIC | Age: 24
End: 2024-09-25
Payer: MEDICARE

## 2024-09-25 DIAGNOSIS — S02.32XD CLOSED FRACTURE OF LEFT ORBITAL FLOOR WITH ROUTINE HEALING, SUBSEQUENT ENCOUNTER: Primary | ICD-10-CM

## 2024-09-25 DIAGNOSIS — S02.31XD CLOSED FRACTURE OF RIGHT ORBITAL FLOOR WITH ROUTINE HEALING: ICD-10-CM

## 2024-09-25 NOTE — TELEPHONE ENCOUNTER
Forms/Letter Request    Type of form/letter: OTHER: Letter for absence at work on    Mon Sept 23- Rodney was in ER 09/20 (unsure what ED location, not [facility])    Do we have the form/letter: No    Who is the form from? Pt needs excuse written for work 1 day only    Where did/will the form come from?     When is form/letter needed by: ASAP    How would you like the form/letter returned: [facility]    Patient Notified form requests are processed in 5-7 business days:Yes    Could we send this information to you in [email or web address] or would you prefer to receive a phone call?:   Patient would prefer a phone call   Okay to leave a detailed message?: Yes at Home number on file [phone] (home)

## 2024-09-26 NOTE — CONFIDENTIAL NOTE
Please do hospital follow-up with this patient as I was able to link his care everywhere account and it appears that he has bilateral orbital floor fractures/facial lacerations from alcohol intoxication.  I suspect that a hospital follow-up appointment is needed.  We could do this virtually.  Does he have follow-up with ENT or maxillofacial to monitor the healing of the orbital fractures?    I need to get more information and ensure that he has a safe plan moving forward.  He can certainly see one of my partners for hospital follow-up as well.  For the work note we can do this at his hospital follow-up as I want to make sure that we do not minimize the other things that need to be addressed due to the severity of this injury.      Michaela Hinton MBA, MS, PA-C  [facility]

## 2024-09-26 NOTE — TELEPHONE ENCOUNTER
Attempt # 1    Called # [phone]     Left a non detailed VM to call back at [phone] and ask for any available Triage Nurse.    JIMY HI RN on 9/26/2024 at 3:56 PM   [facility]

## 2024-09-27 NOTE — TELEPHONE ENCOUNTER
Called and spoke with patient.     Advised of providers recommendation.    Scheduled video visit for ER follow up    Future Appointments   Date Time Provider Department Center   10/2/2024  3:20 PM Michaela Hinton PA-C [facility]        He states he has no follow up for the orbital fx. States he has no pain with that.     JIMY HI RN on 9/27/2024 at 11:37 AM   [facility]

## 2024-09-30 NOTE — TELEPHONE ENCOUNTER
Called and spoke with patient.     Advised of providers recommendation.  Patient stated an understanding and agreed with plan. Number given for patient to schedule.     JIMY HI RN on 9/30/2024 at 11:30 AM   [facility]

## 2024-09-30 NOTE — TELEPHONE ENCOUNTER
I will certainly plan to do the video visit as scheduled on the second, however, on CT the orbital fracture does mention that there could be a muscle entrapment which could cause long-term issues.  He needs to see an ENT and follow-up.  I did put in a referral.  Please make sure he schedule something in the next 1 to 2 weeks.    [facility] will call you to coordin    .lpate your care as prescribed by the provider. If you don t hear from a representative within 2 business days, please call [phone].      Michaela Hinton MBA, MS, PA-C  [facility]

## 2024-10-02 ENCOUNTER — VIRTUAL VISIT (OUTPATIENT)
Dept: FAMILY MEDICINE | Facility: CLINIC | Age: 24
End: 2024-10-02
Payer: MEDICARE

## 2024-10-02 ENCOUNTER — TELEPHONE (OUTPATIENT)
Dept: OPHTHALMOLOGY | Facility: CLINIC | Age: 24
End: 2024-10-02

## 2024-10-02 DIAGNOSIS — S02.31XD CLOSED FRACTURE OF RIGHT ORBITAL FLOOR WITH ROUTINE HEALING: ICD-10-CM

## 2024-10-02 DIAGNOSIS — S09.90XD INJURY OF HEAD, SUBSEQUENT ENCOUNTER: ICD-10-CM

## 2024-10-02 DIAGNOSIS — S02.32XD CLOSED FRACTURE OF LEFT ORBITAL FLOOR WITH ROUTINE HEALING, SUBSEQUENT ENCOUNTER: Primary | ICD-10-CM

## 2024-10-02 PROCEDURE — 99214 OFFICE O/P EST MOD 30 MIN: CPT | Mod: 95 | Performed by: PHYSICIAN ASSISTANT

## 2024-10-02 NOTE — PROGRESS NOTES
"Pavithra is a 24 year old who is being evaluated via a billable video visit.    How would you like to obtain your AVS? MyChart  If the video visit is dropped, the invitation should be resent by: Text to cell phone: [phone]  Will anyone else be joining your video visit? No      Assessment & Plan     Closed fracture of left orbital floor with routine healing, subsequent encounter  Closed fracture of right orbital floor with routine healing  Injury of head, subsequent encounter   Letter written to excuse from work due to injury on 9/23/2024.  Sustained orbital fracture and this is a recurrent issue with concern for nerve entrapment of the left eye musculature..  ENT referral already placed and patient advised that it is absolutely necessary to schedule to ensure appropriate healing and avoid long-term disability as a result.  Patient voiced understanding and agreement.  Denies any chronic neurological sequela.  Advised of warning signs and when to seek urgent care          MED REC REQUIRED  Post Medication Reconciliation Status:  Discharge medications reconciled, continue medications without change  BMI  Estimated body mass index is 27.64 kg/m  as calculated from the following:    Height as of 7/11/24: 1.981 m (6' 6\").    Weight as of 7/11/24: 108.5 kg (239 lb 3.2 oz).   Weight management plan: Deferred, virtual visit        Return in about 2 weeks (around 10/16/2024) for ENT.      Michaela Hinton MBA, MS, PA-C  [facility]   Pavithra is a 24 year old, presenting for the following health issues:  ER F/U        10/2/2024     2:56 PM   Additional Questions   Roomed by Renetta RENTERIA   Accompanied by Self       Video Start Time: 3:24 PM    [facility] [place]     ED/UC Followup:    Facility:  [facility] Emergency Department  Date of visit: 9/21/2024  Reason for visit: Alcohol Abuse With Intoxication, Injury Head Initial; Laceration Face Initial;  Fracture Orbital Floor Closed " Initial Left, Fracture Orbital Floor Closed Initial Right   Current Status: Patient states he is not sore.   Denies double vision.  Denies any issues with ocular movements      CT Maxillofacial 9/21/2024 - [facility]  1.  Bilateral orbital floor fractures, with small amount of hemorrhage in the right maxillary sinus. The left orbital floor fracture could be chronic given absence of left maxillary sinus hemorrhage.    2.  Slight bulging of the left inferior rectus muscle towards the orbital floor fracture defect. Possibility of muscle entrapment should be correlated clinically.     Review of Systems  Constitutional, HEENT, cardiovascular, pulmonary, GI, , musculoskeletal, neuro, skin, endocrine and psych systems are negative, except as otherwise noted.      Objective           Vitals:  No vitals were obtained today due to virtual visit.    Physical Exam   GENERAL: alert and no distress  EYES: Eyes grossly normal to inspection.  No discharge or erythema, or obvious scleral/conjunctival abnormalities.  RESP: No audible wheeze, cough, or visible cyanosis.    SKIN: Visible skin clear. No significant rash, abnormal pigmentation or lesions.  NEURO: Cranial nerves grossly intact.  Mentation and speech appropriate for age.  PSYCH: Appropriate affect, tone, and pace of words                    Video-Visit Details    Type of service:  Video Visit   Video End Time:3:44 PM  Originating Location (pt. Location): Home    Distant Location (provider location):  On-site  Platform used for Video Visit: Zca  Signed Electronically by: Michaela Hinton PA-C

## 2024-10-02 NOTE — TELEPHONE ENCOUNTER
I spoke to the patient to offer him an appointment as an ED follow up.  Advised patient to have follow up for Orbital fracture.  The patient expresses understanding.   The patient has an orbital fracture and he states he has no double vision or eye movement pain.    He is unsure if he will follow up here or near his home.  Triage and scheduling number given to patient. Patient notes he will call us back for scheduling.

## 2024-10-02 NOTE — LETTER
[address] [address]  [phone]       October 2, 2024    Pavithra Davenport  [address] [place] [id number]    To Whom it May Concern:    The above patient was unable to attend work on 9/23/2024 due to a medical issue. Please contact me with questions or concerns.      Sincerely,    Michaela Hinton MBA, MS, PA-C  [facility]

## 2024-10-18 ENCOUNTER — HOSPITAL ENCOUNTER (EMERGENCY)
Facility: CLINIC | Age: 24
Discharge: HOME | End: 2024-10-19
Attending: EMERGENCY MEDICINE | Admitting: EMERGENCY MEDICINE
Payer: COMMERCIAL

## 2024-10-18 ENCOUNTER — APPOINTMENT (OUTPATIENT)
Dept: CT IMAGING | Facility: CLINIC | Age: 24
End: 2024-10-18
Attending: EMERGENCY MEDICINE
Payer: COMMERCIAL

## 2024-10-18 ENCOUNTER — APPOINTMENT (OUTPATIENT)
Dept: GENERAL RADIOLOGY | Facility: CLINIC | Age: 24
End: 2024-10-18
Attending: EMERGENCY MEDICINE
Payer: COMMERCIAL

## 2024-10-18 DIAGNOSIS — M25.511 ACUTE PAIN OF RIGHT SHOULDER: ICD-10-CM

## 2024-10-18 DIAGNOSIS — F10.920 ALCOHOLIC INTOXICATION WITHOUT COMPLICATION (H): ICD-10-CM

## 2024-10-18 DIAGNOSIS — S42.201A CLOSED FRACTURE OF PROXIMAL END OF RIGHT HUMERUS, UNSPECIFIED FRACTURE MORPHOLOGY, INITIAL ENCOUNTER: ICD-10-CM
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
## 2024-10-18 PROCEDURE — 250N000013 HC RX MED GY IP 250 OP 250 PS 637: Performed by: EMERGENCY MEDICINE

## 2024-10-18 PROCEDURE — 99284 EMERGENCY DEPT VISIT MOD MDM: CPT | Mod: 25

## 2024-10-18 PROCEDURE — 70450 CT HEAD/BRAIN W/O DYE: CPT

## 2024-10-18 PROCEDURE — 73030 X-RAY EXAM OF SHOULDER: CPT | Mod: RT

## 2024-10-18 RX ORDER — ACETAMINOPHEN 500 MG
500 TABLET ORAL ONCE
Status: COMPLETED | OUTPATIENT
Start: 2024-10-18 | End: 2024-10-18

## 2024-10-18 RX ADMIN — ACETAMINOPHEN 500 MG: 500 TABLET ORAL at 23:32

## 2024-10-18 ASSESSMENT — COLUMBIA-SUICIDE SEVERITY RATING SCALE - C-SSRS
1. IN THE PAST MONTH, HAVE YOU WISHED YOU WERE DEAD OR WISHED YOU COULD GO TO SLEEP AND NOT WAKE UP?: NO
2. HAVE YOU ACTUALLY HAD ANY THOUGHTS OF KILLING YOURSELF IN THE PAST MONTH?: NO
6. HAVE YOU EVER DONE ANYTHING, STARTED TO DO ANYTHING, OR PREPARED TO DO ANYTHING TO END YOUR LIFE?: NO

## 2024-10-18 ASSESSMENT — ACTIVITIES OF DAILY LIVING (ADL)
ADLS_ACUITY_SCORE: 35
ADLS_ACUITY_SCORE: 35

## 2024-10-18 NOTE — LETTER
October 19, 2024      To Whom It May Concern:      Pavithra Davenport was seen in our Emergency Department today, 10/19/24.  I expect his condition to improve over the next 3 days or until seen and followed by Orthopedics.  He may return to work when improved.    Sincerely,        JAMES Kim         Chief Complaint   Patient presents with   • Medication Management     The patient is here for a medication check.          The patient states that she is doing well.  She is working with Dr. Gallegos to get her pain under better control.  She is trying to keep with her fruits and vegetables.  She denies chest pain or shortness of breath with exertion.    Hemoglobin A1C (%)   Date Value   06/19/2019 5.6     Sodium (mmol/L)   Date Value   06/19/2019 143   05/15/2018 142   09/20/2017 141     Potassium (mmol/L)   Date Value   06/19/2019 4.5   05/15/2018 4.2   09/20/2017 4.6     Chloride (mmol/L)   Date Value   06/19/2019 106   05/15/2018 103   09/20/2017 104     CO2 (mmol/L)   Date Value   04/12/2007 31   04/10/2006 31   03/08/2005 31 (H)     Carbon Dioxide (mmol/L)   Date Value   06/19/2019 26   05/15/2018 31   09/20/2017 25     BUN (mg/dL)   Date Value   06/19/2019 17   05/15/2018 18   09/20/2017 13     Creatinine (mg/dL)   Date Value   09/17/2019 0.84   06/19/2019 0.79   08/16/2018 0.73     Glucose (mg/dL)   Date Value   06/19/2019 89   05/15/2018 113 (H)   09/20/2017 89     No results found for: MUTP, URMIC, UCR, MALBCR  CHOLESTEROL (mg/dL)   Date Value   06/19/2019 185   10/03/2016 193   03/08/2016 190     HDL (mg/dL)   Date Value   06/19/2019 60   10/03/2016 75   03/08/2016 68     CALCULATED LDL (mg/dL)   Date Value   06/19/2019 99   03/08/2016 97   02/03/2015 72     TRIGLYCERIDE (mg/dL)   Date Value   06/19/2019 128   10/03/2016 83   03/08/2016 126           Past Medical History:   Diagnosis Date   • Allergic rhinitis    • Anxiety    • Asthma    • Cardiomegaly     moderately enlarged left ventricle   • COPD (chronic obstructive pulmonary disease) (CMS/HCC)    • Depression    • Disorder of bone and cartilage, unspecified 11/08/2011   • Esophageal reflux    • Irritable bowel syndrome    • Microscopic colitis    • ARLINE on CPAP    • Osteoarthrosis, unspecified whether generalized or localized, unspecified site     DJD    • Pulmonary nodules     scarring and pulmonary nodules rll lung   • RA (rheumatoid arthritis) (CMS/HCC)     sees Dr. Osorio   • Sleep apnea    • Unspecified essential hypertension     Hypertension   • Unspecified hypothyroidism    • Urge and stress incontinence       Past Surgical History:   Procedure Laterality Date   • Breast cyst excision     • Cataract extraction, bilateral Bilateral     left 2006; right 2016   • Cholecystectomy     • Colonoscopy  2013    small lipoma in descending colon   • Dexa bone density axial skeleton  2011   • Lumbar epidural injection  06/10/2019   • Mammo screening bilateral  2011   • Ovarian cyst removal     • Removal of ovary(s)     • Repair brow ptosis  4/26/10      Social History     Socioeconomic History   • Marital status: /Civil Union     Spouse name: Gabo   • Number of children: 5   • Years of education: 14   • Highest education level: Not on file   Occupational History   • Occupation: Retired   Social Needs   • Financial resource strain: Not on file   • Food insecurity:     Worry: Not on file     Inability: Not on file   • Transportation needs:     Medical: Not on file     Non-medical: Not on file   Tobacco Use   • Smoking status: Former Smoker     Packs/day: 1.00     Years: 40.00     Pack years: 40.00     Types: Cigarettes     Last attempt to quit: 1995     Years since quittin.4   • Smokeless tobacco: Never Used   Substance and Sexual Activity   • Alcohol use: No     Alcohol/week: 0.0 - 2.0 standard drinks     Frequency: Never     Comment: seldomly has a glass of wine   • Drug use: No   • Sexual activity: Not on file   Lifestyle   • Physical activity:     Days per week: Not on file     Minutes per session: Not on file   • Stress: Not on file   Relationships   • Social connections:     Talks on phone: Not on file     Gets together: Not on file     Attends [employer] service: Not on file     Active member of club or organization:  Not on file     Attends meetings of clubs or organizations: Not on file     Relationship status: Not on file   • Intimate partner violence:     Fear of current or ex partner: Not on file     Emotionally abused: Not on file     Physically abused: Not on file     Forced sexual activity: Not on file   Other Topics Concern   •  Service Not Asked   • Blood Transfusions Not Asked   • Caffeine Concern Yes     Comment: 3 daily   • Occupational Exposure Not Asked   • Hobby Hazards Not Asked   • Sleep Concern Not Asked   • Stress Concern Not Asked   • Weight Concern Not Asked   • Special Diet Not Asked   • Back Care Not Asked   • Exercise No   • Bike Helmet Not Asked   • Seat Belt Not Asked   • Self-Exams Not Asked   Social History Narrative    Smoked for 40 years, less than a pack / day and has quit.  Rare ETOH.  Active in her garden and the yard.  .  Has 5 children.  Not doing well with exercise.  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  [email or web address].  No chest pain but some back pain with house work.        Family History   Problem Relation Age of Onset   • Neurological Disorder Mother          Alzheimer's age 82   • Stroke Father          at age 72   • Hypertension Father    • Stroke Sister    • Multiple Sclerosis Brother    • Myocardial Infarction Brother    • Neurological Disorder Maternal Grandmother         Alzheimer's,  at age 98   • Cancer Paternal Grandmother         breast cancer   • Injuries Paternal Grandfather         suicide at age 50   • Celiac Disease Son       Current Outpatient Medications   Medication Sig Dispense Refill   • losartan (COZAAR) 50 MG tablet Take 1 tablet by mouth 2 times daily. 180 tablet 1   • HYDROcodone-acetaminophen (NORCO) 5-325 MG per tablet Take 1 tablet by mouth every 6 hours as needed for Pain. 90 tablet 0   • hydroxychloroquine (PLAQUENIL) 200 MG tablet TAKE 1 AND 1/2 TABLETS BY MOUTH DAILY 135 tablet 0   • PARoxetine (PAXIL) 20 MG tablet TAKE 1 TABLET BY  MOUTH EVERY MORNING 90 tablet 1   • gabapentin (NEURONTIN) 100 MG capsule Take 2 capsules by mouth 3 times daily. Driving precautions 180 capsule 0   • montelukast (SINGULAIR) 10 MG tablet TAKE 1 TABLET BY MOUTH EVERY EVENING 90 tablet 0   • levothyroxine 88 MCG tablet TAKE 1 TABLET BY MOUTH DAILY 90 tablet 0   • metoPROLOL succinate (TOPROL-XL) 25 MG 24 hr tablet TAKE 2 TABLETS BY MOUTH DAILY 180 tablet 0   • QVAR REDIHALER 40 MCG/ACT inhaler INHALE 2 PUFFS BY MOUTH INTO THE LUNGS TWICE DAILY 10.6 g 2   • amLODIPine (NORVASC) 2.5 MG tablet Take 1 tablet by mouth daily. 90 tablet 1   • loperamide (IMODIUM) 2 MG capsule TAKE 1 CAPSULE BY MOUTH DAILY 90 capsule 3   • nystatin (NYSTOP) 094909 UNIT/GM powder Apply topically 3 times daily. 30 g 11   • albuterol 108 (90 Base) MCG/ACT inhaler INHALE 2 PUFFS INTO THE LUNGS EVERY 4 HOURS AS NEEDED FOR SHORTNESS OF BREATH OR WHEEZING 3 Inhaler 3   • omeprazole (PRILOSEC) 40 MG capsule TAKE 1 CAPSULE BY MOUTH DAILY 90 capsule 2   • cholestyramine (QUESTRAN) 4 g packet MIX AND DRINK 1 PACKET BY MOUTH THREE TIMES DAILY WITH MEALS 270 packet 3   • Hypromellose (ARTIFICIAL TEARS OP) Apply to eye 4 times daily.      • menthol-methyl salicylate analgesic (BENGAY, ICY HOT) 10-15 % CREA Apply topically every 6 hours as needed (Uses mineral ice as alternate therapy).      • diphenhydrAMINE (BENADRYL) 25 MG capsule Take 25 mg by mouth every 6 hours as needed.     • Aspirin 81 MG OR TABS 1 TABLET DAILY 30 0     No current facility-administered medications for this visit.             Physical Exam    Vital Signs:    Vitals:    06/19/20 1227   BP: 126/88   Pulse: 96   Resp: 14   Temp: 96.3 °F (35.7 °C)   TempSrc: Tympanic   Weight: 103.5 kg   Height: 5' 2\" (1.575 m)     General:  Well developed, well nourished. In no apparent distress.    Eyes:   EOMI (extraocular movements intact). Conjunctivae pink. Sclerae anicteric.    Neurologic:  Alert and oriented x 3.   Integumentary:  Warm. Dry.  Pink. No rashes or lesions. No wounds.    Psychiatric: Cooperative. Appropriate mood and affect. Normal judgment.        Orders Placed This Encounter   • Basic Metabolic Panel         ASSESSMENT AND PLAN:    Essential hypertension  (primary encounter diagnosis)  Plan: BASIC METABOLIC PANEL        Blood pressure in good range today.  Continue current therapy    Rheumatoid arthritis involving multiple sites with positive rheumatoid factor (CMS/[facility])  Plan: stable    Chronic obstructive pulmonary disease, unspecified COPD type (CMS/[facility])  Plan: doing well on current inhaler therapy.      Body mass index (BMI) 40.0-44.9, adult (CMS/[facility])  Plan: encouraged increased activity increased fruits and vegetables.

## 2024-10-19 VITALS
RESPIRATION RATE: 18 BRPM | SYSTOLIC BLOOD PRESSURE: 138 MMHG | HEART RATE: 89 BPM | DIASTOLIC BLOOD PRESSURE: 91 MMHG | OXYGEN SATURATION: 99 %

## 2024-10-19 PROCEDURE — 250N000013 HC RX MED GY IP 250 OP 250 PS 637: Performed by: EMERGENCY MEDICINE

## 2024-10-19 RX ORDER — IBUPROFEN 600 MG/1
600 TABLET, FILM COATED ORAL ONCE
Status: COMPLETED | OUTPATIENT
Start: 2024-10-19 | End: 2024-10-19

## 2024-10-19 RX ADMIN — IBUPROFEN 600 MG: 600 TABLET, FILM COATED ORAL at 02:21

## 2024-10-19 ASSESSMENT — ACTIVITIES OF DAILY LIVING (ADL)
ADLS_ACUITY_SCORE: 35
ADLS_ACUITY_SCORE: 35

## 2024-10-19 NOTE — DISCHARGE INSTRUCTIONS
Please continue with the sling for support of your arm.  You need to follow-up with orthopedic surgery to discuss your fracture and determine if this will require surgical fixation.    Return immediately to the ER if you develop worsening pain, numbness or weakness of your arms or any other concerns.    Discharge Instructions  Extremity Injury    You were seen today for an injury to an extremity (arm, hand, leg, or foot). You may have a bruise, strain, or fracture (broken bone).    Generally, every Emergency Department visit should have a follow-up clinic visit with either a primary or a specialty clinic/provider. Please follow-up as instructed by your emergency provider today.  Return to the Emergency Department right away if:  Your pain seems to change or get worse or there is pain in a new area that wasn t evaluated today.  Your extremity becomes pale, cool, blue, or numb or tingling past the injury.  You have more drainage, redness or pain in the area of the cut or abrasion.  You have pain that you cannot control with the medicine recommended or prescribed here, or you have pain that seems too much for your injury.  Your child (who is injured) will not stop crying or is much more fussy than normal.  You have new symptoms or anything that worries you.    What to Expect:  Your swelling and pain may be worse the day after your injury, but should not be severe and should start getting better after that. You should not have new symptoms and your pain should not get worse.  You may start to get a bruise over the injured area or below the injured area (bruising can follow gravity).  Your movement and strength should get better with time.  Some injuries may not show up until after you have left the Emergency Department so it is important to follow-up as directed.  Your injury may prevent you from working.  Follow-up with your regular provider to get a work release note.  Pain medications or your injury may make it  unsafe to drive or operate machinery.    Home Care:  RICE: Rest, Ice, Compression, Elevation  Rest: Rest your injured area for at least 1-2 days. After that you may start using your extremity again as long as there is not too much pain.   Ice: Apply ice your injured area for 15 minutes at a time, at least 3 times a day. Use a cloth between the ice bag and your skin to prevent frostbite. Do not sleep with an ice pack or heating pad on, since this can cause burns or skin injury.  Compression: You may use an elastic bandage (Ace  Wrap) if it makes you more comfortable. Wrap it just tight enough to provide light compression, like a new pair of socks feels. Loosen the bandage if you have swelling past the bandage.  Elevation: Raise the injured area above the level of your heart as much as possible in the first 1-2 days.    Use Tylenol  (acetaminophen), Motrin (ibuprofen), or Advil  (ibuprofen) for your pain unless you have an allergy or are told not to use these medications by your provider.  Take the medications as instructed on the package. Tylenol  (acetaminophen) is in many prescription medicines and non-prescription medicines--check all of your medicines to be sure you aren t taking more than 3000 mg per day.  Please follow any other instructions that were discussed with you by your provider.    Stretching/Exercises:  You may have been provided with instructions for stretching or exercises. If your injury was to your arm or shoulder and your provider put you in a sling or an immobilizer, it is important that you take off your immobilizer within 3 days and stretch/move your shoulder, unless your provider specifically tells you to not move your shoulder.  This is to prevent further injury such as a  frozen shoulder .     If you were given a prescription for medicine here today, be sure to read all of the information (including the package insert) that comes with your prescription.  This will include important  information about the medicine, its side effects, and any warnings that you need to know about.  The pharmacist who fills the prescription can provide more information and answer questions you may have about the medicine.  If you have questions or concerns that the pharmacist cannot address, please call or return to the Emergency Department.     Remember that you can always come back to the Emergency Department if you are not able to see your regular provider in the amount of time listed above, if you get any new symptoms, or if there is anything that worries you.  Discharge Instructions  Alcohol Intoxication    You have been seen today with alcohol intoxication. This means that you have enough alcohol in your system to impair your ability to mentally and physically function, perhaps to the extent that you were unable to care for yourself.    Generally, every Emergency Department visit should have a follow-up clinic visit with either a primary or a specialty clinic/provider. Please follow-up as instructed by your emergency provider today.    You may have come to the Emergency Department because of your intoxication, or for another reason, such as because of an injury. No matter what the case is, this visit is a  red flag  regarding alcohol use, and you should consider whether your drinking pattern is a problem for you.     You may be at risk for alcohol-related problems if:    Men: you drink more than 14 drinks per week, or more than 4 drinks per occasion.    Women: you drink more than 7 drinks per week or more than 3 drinks per occasion.    You have black-outs.  You do things you regret while drinking.  You have legal problems because of drinking.  You have job problems because of drinking (you call in sick to work because of drinking).    CAGE Questions  Have you ever felt you should cut down on your drinking?  Have people annoyed you by criticizing your drinking?  Have you ever felt bad or guilty about your  drinking?  Have you ever had a drink first thing in the morning to steady your nerves or get rid of a hangover (eye opener)?    If you answer yes to any of the CAGE questions, you may have a problem with alcohol.      Return to the Emergency Department if:  You become shaky or tremble when you try to stop drinking.   You have severe abdominal pain (belly pain).   You have a seizure or pass out.    You vomit (throw up) blood or have blood in your stool. This may be bright red or it may look like black coffee grounds.  You become lightheaded or faint.      For further help, contact:   Your caregiver.    Alcoholics Anonymous (AA).    [facility] Intergroup: [phone]  [facility] Central Office: [phone]   A drug or alcohol rehabilitation program.    You can get information on alcohol resources and groups by calling the number 211 or 1-459.180.2563 on any phone.     Seek medical care if:  You have persistent vomiting.   You have persistent pain in any part of your body.    You do not feel better after a few days.    If you were given a prescription for medicine here today, be sure to read all of the information (including the package insert) that comes with your prescription.  This will include important information about the medicine, its side effects, and any warnings that you need to know about.  The pharmacist who fills the prescription can provide more information and answer questions you may have about the medicine.  If you have questions or concerns that the pharmacist cannot address, please call or return to the Emergency Department.   Remember that you can always come back to the Emergency Department if you are not able to see your regular doctor in the amount of time listed above, if you get any new symptoms, or if there is anything that worries you.      I would avoid drinking alcohol in excess
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
## 2024-10-19 NOTE — ED NOTES
Pt discharged. Discharged instructions and papers given. Discharged on stable condition accompanied by friend. Ambulatory.
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
## 2024-10-21 ENCOUNTER — TELEPHONE (OUTPATIENT)
Dept: FAMILY MEDICINE | Facility: CLINIC | Age: 24
End: 2024-10-21
Payer: MEDICARE
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
## 2024-10-23 NOTE — TELEPHONE ENCOUNTER
I spoke with Pavithra on the phone, and he confirmed that now that he has an orthopedic appointment scheduled for Saturday, he no longer needs to set up an appointment with us.

## 2024-10-26 ENCOUNTER — ANCILLARY PROCEDURE (OUTPATIENT)
Dept: GENERAL RADIOLOGY | Facility: CLINIC | Age: 24
End: 2024-10-26
Attending: PHYSICIAN ASSISTANT
Payer: COMMERCIAL

## 2024-10-26 ENCOUNTER — OFFICE VISIT (OUTPATIENT)
Dept: ORTHOPEDICS | Facility: CLINIC | Age: 24
End: 2024-10-26
Payer: COMMERCIAL

## 2024-10-26 VITALS — SYSTOLIC BLOOD PRESSURE: 138 MMHG | HEART RATE: 82 BPM | DIASTOLIC BLOOD PRESSURE: 84 MMHG

## 2024-10-26 DIAGNOSIS — M25.511 ACUTE PAIN OF RIGHT SHOULDER DUE TO TRAUMA: ICD-10-CM

## 2024-10-26 DIAGNOSIS — G89.11 ACUTE PAIN OF RIGHT SHOULDER DUE TO TRAUMA: ICD-10-CM

## 2024-10-26 DIAGNOSIS — G89.11 ACUTE PAIN OF RIGHT SHOULDER DUE TO TRAUMA: Primary | ICD-10-CM

## 2024-10-26 DIAGNOSIS — S42.294A OTHER CLOSED NONDISPLACED FRACTURE OF PROXIMAL END OF RIGHT HUMERUS, INITIAL ENCOUNTER: ICD-10-CM

## 2024-10-26 DIAGNOSIS — M25.511 ACUTE PAIN OF RIGHT SHOULDER DUE TO TRAUMA: Primary | ICD-10-CM

## 2024-10-26 PROCEDURE — 99203 OFFICE O/P NEW LOW 30 MIN: CPT | Performed by: PHYSICIAN ASSISTANT

## 2024-10-26 PROCEDURE — 73030 X-RAY EXAM OF SHOULDER: CPT | Mod: TC | Performed by: RADIOLOGY

## 2024-10-26 NOTE — LETTER
October 26, 2024      Pavithra Davenport  [address] [place] [id number]        To Whom It May Concern:    Pavithra Davenport was seen in our clinic. He may return to work with the following: no use of the right upper extremity and wear sling at all times.      Sincerely,      Rimma Aldrich
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
## 2024-10-28 ENCOUNTER — MYC MEDICAL ADVICE (OUTPATIENT)
Dept: ORTHOPEDICS | Facility: CLINIC | Age: 24
End: 2024-10-28

## 2024-10-28 ENCOUNTER — ANCILLARY PROCEDURE (OUTPATIENT)
Dept: CT IMAGING | Facility: CLINIC | Age: 24
End: 2024-10-28
Attending: PHYSICIAN ASSISTANT
Payer: MEDICARE

## 2024-10-28 DIAGNOSIS — S42.291A OTHER CLOSED DISPLACED FRACTURE OF PROXIMAL END OF RIGHT HUMERUS, INITIAL ENCOUNTER: ICD-10-CM

## 2024-10-28 DIAGNOSIS — S42.291A OTHER CLOSED DISPLACED FRACTURE OF PROXIMAL END OF RIGHT HUMERUS, INITIAL ENCOUNTER: Primary | ICD-10-CM

## 2024-10-28 PROCEDURE — G1010 CDSM STANSON: HCPCS | Performed by: RADIOLOGY

## 2024-10-28 PROCEDURE — 73200 CT UPPER EXTREMITY W/O DYE: CPT | Mod: RT | Performed by: RADIOLOGY

## 2024-10-28 NOTE — TELEPHONE ENCOUNTER
Attempted to call patient, no answer, left voicemail to call back.      In case patient calls back, I spoke with one of the Orthopedic surgeons who reviewed his x-rays and recommend that he get a stat CT scan of his shoulder and a follow up with one of them to review and discuss possible need for surgery given the changes in his x-ray changes.

## 2024-10-29 ENCOUNTER — OFFICE VISIT (OUTPATIENT)
Dept: ORTHOPEDICS | Facility: CLINIC | Age: 24
End: 2024-10-29
Payer: COMMERCIAL

## 2024-10-29 VITALS — WEIGHT: 222.2 LBS | BODY MASS INDEX: 25.68 KG/M2 | SYSTOLIC BLOOD PRESSURE: 132 MMHG | DIASTOLIC BLOOD PRESSURE: 81 MMHG

## 2024-10-29 DIAGNOSIS — S42.291A OTHER CLOSED DISPLACED FRACTURE OF PROXIMAL END OF RIGHT HUMERUS, INITIAL ENCOUNTER: ICD-10-CM

## 2024-10-29 PROCEDURE — 99203 OFFICE O/P NEW LOW 30 MIN: CPT | Performed by: ORTHOPAEDIC SURGERY

## 2024-10-29 NOTE — LETTER
October 29, 2024      Pavithra Davenport  [address] [place] [id number]        To Whom It May Concern:    Pavithra Davenport was seen in our clinic. He may return to work with the following restrictions: He must wear the sling at all times while at work.  He cannot lift, push, or pull anything over 1 pound.      Sincerely,      Wade Freeman
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
## 2024-11-05 ENCOUNTER — ANCILLARY PROCEDURE (OUTPATIENT)
Dept: GENERAL RADIOLOGY | Facility: CLINIC | Age: 24
End: 2024-11-05
Attending: ORTHOPAEDIC SURGERY
Payer: MEDICARE

## 2024-11-05 ENCOUNTER — OFFICE VISIT (OUTPATIENT)
Dept: ORTHOPEDICS | Facility: CLINIC | Age: 24
End: 2024-11-05
Payer: MEDICARE

## 2024-11-05 VITALS
WEIGHT: 222 LBS | BODY MASS INDEX: 25.69 KG/M2 | HEIGHT: 78 IN | DIASTOLIC BLOOD PRESSURE: 80 MMHG | SYSTOLIC BLOOD PRESSURE: 108 MMHG

## 2024-11-05 DIAGNOSIS — S42.291A OTHER CLOSED DISPLACED FRACTURE OF PROXIMAL END OF RIGHT HUMERUS, INITIAL ENCOUNTER: Primary | ICD-10-CM

## 2024-11-05 DIAGNOSIS — S42.291A OTHER CLOSED DISPLACED FRACTURE OF PROXIMAL END OF RIGHT HUMERUS, INITIAL ENCOUNTER: ICD-10-CM

## 2024-11-05 PROCEDURE — 73030 X-RAY EXAM OF SHOULDER: CPT | Mod: TC | Performed by: RADIOLOGY

## 2024-11-05 PROCEDURE — 99213 OFFICE O/P EST LOW 20 MIN: CPT | Performed by: ORTHOPAEDIC SURGERY
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
## 2024-11-12 ASSESSMENT — ACTIVITIES OF DAILY LIVING (ADL): PLEASE_INDICATE_YOR_PRIMARY_REASON_FOR_REFERRAL_TO_THERAPY:: SHOULDER

## 2024-11-13 ENCOUNTER — TELEPHONE (OUTPATIENT)
Dept: ORTHOPEDICS | Facility: CLINIC | Age: 24
End: 2024-11-13

## 2024-11-13 ENCOUNTER — THERAPY VISIT (OUTPATIENT)
Dept: PHYSICAL THERAPY | Facility: CLINIC | Age: 24
End: 2024-11-13
Attending: ORTHOPAEDIC SURGERY
Payer: COMMERCIAL

## 2024-11-13 DIAGNOSIS — S42.291A OTHER CLOSED DISPLACED FRACTURE OF PROXIMAL END OF RIGHT HUMERUS, INITIAL ENCOUNTER: ICD-10-CM

## 2024-11-13 PROCEDURE — 97161 PT EVAL LOW COMPLEX 20 MIN: CPT | Mod: GP | Performed by: PHYSICAL THERAPIST

## 2024-11-13 PROCEDURE — 97110 THERAPEUTIC EXERCISES: CPT | Mod: GP | Performed by: PHYSICAL THERAPIST

## 2024-11-13 NOTE — TELEPHONE ENCOUNTER
Please also see duplicate telephone encounter dated 11/8/24.     Phone call to Valerie [employer] for [employer]. Their extensions are not working right now. She was able to look up the claim. Explained that we do not have an AMPARO or permission to speak with them. Usually medical records provides the office visit notes after receiving the AMPARO. We have not been made aware by patient that he requested the form.     She states they faxed a form and AMPARO on 11/11/24 to [phone].   She will re-fax, but states it can take an hour to get it. She was told there is no guarantee we can answer today, but will do what we can. She verbalized understanding.     DIMITRI Arnold RN

## 2024-11-13 NOTE — TELEPHONE ENCOUNTER
Received faxed request from [facility] for medical records 10/18/24 to present. Request was faxed to [facility] as urgent. Confirmed it went through via [employer].     DIMITRI Arnold RN
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
## 2024-11-13 NOTE — TELEPHONE ENCOUNTER
Patient Returning Call    Reason for call:  met life calling needing to know patients diagnosis, they are needing this information today     Information relayed to patient:  te sent to clinic     Patient has additional questions:  No      [facility] or would you prefer to receive a phone call?:   Patient would prefer a phone call   Okay to leave a detailed message?: Yes at Other phone number:  [phone] ext [phone] Sandi with [facility]

## 2024-11-21 ENCOUNTER — THERAPY VISIT (OUTPATIENT)
Dept: PHYSICAL THERAPY | Facility: CLINIC | Age: 24
End: 2024-11-21
Attending: ORTHOPAEDIC SURGERY
Payer: COMMERCIAL

## 2024-11-21 DIAGNOSIS — S42.291A OTHER CLOSED DISPLACED FRACTURE OF PROXIMAL END OF RIGHT HUMERUS, INITIAL ENCOUNTER: Primary | ICD-10-CM

## 2024-11-25 NOTE — PROGRESS NOTES
[facility]   ORTHOPEDIC SURGERY CLINIC  [place]    PATIENT:   Pavithra Davenport  YOB: 2000  DATE OF SERVICE:   12/3/24    CHIEF COMPLAINT:  Closed displaced comminuted fracture of proximal right humerus   DOI 10/19/2024    PROCEDURES:  None    HISTORY OF PRESENT ILLNESS:  Pavithra Davenport is a 24 year old who presents for routine follow-up 6 weeks post injury. He states his shoulder is not in any pain unless he reaches hand above head.  Physical therapy is going well, and states he has nearly full range of motion. He has been doing some home exercises.  Not taking any pain medications currently. Denies numbness, tingling or weakness.  Denies new injury.    PHYSICAL EXAM:  Awake alert and oriented in no apparent distress.  Focused exam of the right upper extremity demonstrates no deformity.   Skin is clean, dry and intact.    No erythema, ecchymosis, swelling or warmth.   No lymphedema.   No tenderness to palpation about the shoulder girdle or upper extremity.   Shoulder range of motion forward elevation to 160 , abduction 90 , external rotation 50 , internal rotation L1.  Elbow range of motion extension  0 , flexion 130 .  Forearm range of motion pronation 85 , supination 85 .  Wrist range of motion extension 70  and flexion 80 .  Able to make a full composite fist  CMC Intact Distally  Sensation intact in axillary, lateral antebrachial cutaneous, radial median and ulnar distributions.  Motor intact in deltoid, biceps, triceps, wrist flexors, wrist extensors, EPL, FPL, and hand intrinsics.   Radial pulse intact and capillary refill brisk     IMAGING:  Right shoulder x-rays obtained today personally reviewed by me.  Stable fracture alignment.  Congruent glenohumeral joint.  Interval healing with interval callus formation.  Fracture line still evident    LABS:  None    ASSESSMENT:  Doing well 6 weeks post injury with no apparent acute complications.    PLAN:  Active,  active assisted and  passive range of motion gently and progressively as tolerated.  5 pound maximum lift, push and pull restriction  Importance of compliance with activity restrictions stressed  Patient verbalized understanding  Return in 4 weeks with repeat x-rays  Work note provided    X-RAYS NEXT VISIT:  AP, lateral and axillary view right shoulder    Wade Freeman MD, FAAOS    Orthopedic Trauma  Adult Reconstruction  Department of Orthopedic Surgery  [facility]

## 2024-12-03 ENCOUNTER — OFFICE VISIT (OUTPATIENT)
Dept: ORTHOPEDICS | Facility: CLINIC | Age: 24
End: 2024-12-03
Payer: COMMERCIAL

## 2024-12-03 ENCOUNTER — ANCILLARY PROCEDURE (OUTPATIENT)
Dept: GENERAL RADIOLOGY | Facility: CLINIC | Age: 24
End: 2024-12-03
Attending: ORTHOPAEDIC SURGERY
Payer: MEDICARE

## 2024-12-03 VITALS
HEIGHT: 78 IN | BODY MASS INDEX: 25.69 KG/M2 | WEIGHT: 222 LBS | SYSTOLIC BLOOD PRESSURE: 114 MMHG | DIASTOLIC BLOOD PRESSURE: 66 MMHG

## 2024-12-03 DIAGNOSIS — S42.291A OTHER CLOSED DISPLACED FRACTURE OF PROXIMAL END OF RIGHT HUMERUS, INITIAL ENCOUNTER: Primary | ICD-10-CM

## 2024-12-03 DIAGNOSIS — S42.291A OTHER CLOSED DISPLACED FRACTURE OF PROXIMAL END OF RIGHT HUMERUS, INITIAL ENCOUNTER: ICD-10-CM

## 2024-12-03 PROCEDURE — 99213 OFFICE O/P EST LOW 20 MIN: CPT | Performed by: ORTHOPAEDIC SURGERY

## 2024-12-03 PROCEDURE — 73030 X-RAY EXAM OF SHOULDER: CPT | Mod: TC | Performed by: STUDENT IN AN ORGANIZED HEALTH CARE EDUCATION/TRAINING PROGRAM

## 2024-12-03 NOTE — LETTER
December 3, 2024      Pavithra Davenport  [address] [place] [id number]        To Whom It May Concern:    Pavithra Davenport  was seen on 12/3/2024.  Please excuse him from work until 12/31/2024 due to injury of right shoulder.        Sincerely,        Wade Freeman MD

## 2024-12-03 NOTE — LETTER
12/3/2024      Pavithra Davenport  [address] [facility] [address]      Dear Colleague,    Thank you for referring your patient, Pavithra Davenport, to the [facility] ORTHOP[facility] [place]. Please see a copy of my visit note below.    [facility]   ORTHOPEDIC SURGERY CLINIC  [place]    PATIENT:   Pavithra Davenport  YOB: 2000  DATE OF SERVICE:   12/3/24    CHIEF COMPLAINT:  Closed displaced comminuted fracture of proximal right humerus   DOI 10/19/2024    PROCEDURES:  None    HISTORY OF PRESENT ILLNESS:  Pavithra Davenport is a 24 year old who presents for routine follow-up 6 weeks post injury. He states his shoulder is not in any pain unless he reaches hand above head.  Physical therapy is going well, and states he has nearly full range of motion. He has been doing some home exercises.  Not taking any pain medications currently. Denies numbness, tingling or weakness.  Denies new injury.    PHYSICAL EXAM:  Awake alert and oriented in no apparent distress.  Focused exam of the right upper extremity demonstrates no deformity.   Skin is clean, dry and intact.    No erythema, ecchymosis, swelling or warmth.   No lymphedema.   No tenderness to palpation about the shoulder girdle or upper extremity.   Shoulder range of motion forward elevation to 160 , abduction 90 , external rotation 50 , internal rotation L1.  Elbow range of motion extension  0 , flexion 130 .  Forearm range of motion pronation 85 , supination 85 .  Wrist range of motion extension 70  and flexion 80 .  Able to make a full composite fist  CMC Intact Distally  Sensation intact in axillary, lateral antebrachial cutaneous, radial median and ulnar distributions.  Motor intact in deltoid, biceps, triceps, wrist flexors, wrist extensors, EPL, FPL, and hand intrinsics.   Radial pulse intact and capillary refill brisk     IMAGING:  Right shoulder x-rays obtained today personally reviewed by me.  Stable  fracture alignment.  Congruent glenohumeral joint.  Interval healing with interval callus formation.  Fracture line still evident    LABS:  None    ASSESSMENT:  Doing well 6 weeks post injury with no apparent acute complications.    PLAN:  Active, active assisted and  passive range of motion gently and progressively as tolerated.  5 pound maximum lift, push and pull restriction  Importance of compliance with activity restrictions stressed  Patient verbalized understanding  Return in 4 weeks with repeat x-rays  Work note provided    X-RAYS NEXT VISIT:  AP, lateral and axillary view right shoulder    Wade Freeman MD, FAAOS    Orthopedic Trauma  Adult Reconstruction  Department of Orthopedic Surgery  [facility]      Again, thank you for allowing me to participate in the care of your patient.        Sincerely,        Wade Freeman MD

## 2024-12-10 ENCOUNTER — THERAPY VISIT (OUTPATIENT)
Dept: PHYSICAL THERAPY | Facility: CLINIC | Age: 24
End: 2024-12-10
Payer: COMMERCIAL

## 2024-12-10 DIAGNOSIS — S42.291A OTHER CLOSED DISPLACED FRACTURE OF PROXIMAL END OF RIGHT HUMERUS, INITIAL ENCOUNTER: Primary | ICD-10-CM

## 2024-12-10 PROCEDURE — 97110 THERAPEUTIC EXERCISES: CPT | Mod: GP | Performed by: PHYSICAL THERAPIST

## 2024-12-30 PROBLEM — S42.291A OTHER CLOSED DISPLACED FRACTURE OF PROXIMAL END OF RIGHT HUMERUS, INITIAL ENCOUNTER: Status: RESOLVED | Noted: 2024-11-13 | Resolved: 2024-12-30

## 2025-02-05 NOTE — PROGRESS NOTES
[facility]   ORTHOPEDIC SURGERY CLINIC  [place]    PATIENT:   Pavithra Davenport  YOB: 2000  DATE OF SERVICE:   02/06/25    CHIEF COMPLAINT:  Closed displaced comminuted fracture of proximal right humerus   DOI 10/19/2024     PROCEDURES:  None    HISTORY OF PRESENT ILLNESS:  12/3/2025 - Pavithra Davenport is a 24 year old who presents for routine follow-up 12 weeks post injury.  He completed physical therapy 12/10/2024 has not done therapy since.  He reports he has been working out intensely at the gym including heavy weights.  Aside from limited forward elevation he has not had any issues and is very pleased with his progress.  Not taking any pain medications currently. Denies numbness, tingling or weakness.  Denies new injury. He state the shoulder is doing well. He is not taking any pain medications, but does use Biofreeze occasionally.      PHYSICAL EXAM:  Awake alert and oriented in no apparent distress.  Body mass index is 25.65 kg/m .   Focused exam of the right upper extremity demonstrates no deformity.   Skin is clean, dry and intact.    No erythema, ecchymosis, swelling or warmth.   No lymphedema.   No tenderness to palpation about the shoulder girdle or upper extremity.   Shoulder range of motion forward elevation to 140 , abduction 100 , external rotation 50 , internal rotation L5.  Elbow range of motion extension  0 , flexion 130 .  Forearm range of motion pronation 85 , supination 85 .  Wrist range of motion extension 70  and flexion 80 .  Able to make a full composite fist  CMC Intact Distally  Sensation intact in axillary, lateral antebrachial cutaneous, radial median and ulnar distributions.  Motor intact in deltoid, biceps, triceps, wrist flexors, wrist extensors, EPL, FPL, and hand intrinsics.   Radial pulse intact and capillary refill brisk    IMAGING:  AP, lateral and axillary view right shoulder obtained today personally reviewed by  me  Stable fracture alignment with interval healing  Slight varus alignment  Congruent glenohumeral joint  No acute fractures  No degenerative changes    LABS:  None     ASSESSMENT:  Doing well overall with continued limited forward elevation typical  post injury stiffness versus element of frozen shoulder.  Slight varus alignment could be contributing as well.    PLAN:  Long detailed discussion with the patient.  I advised him that he has typical postop stiffness but may have an element of frozen shoulder.  I reassured him that his range of motion should continue to improve slowly in the coming months but it will be slow.  He may continue with activities to tolerance without any specific restrictions.  He should return in 3 months for repeat evaluation if needed.  Patient verbalized understanding of plan and is amenable.  All pressure answered detail to his satisfaction.    X-RAYS NEXT VISIT:  AP, lateral and axillary view right shoulder    Wade Freeman MD, FAAOS    Orthopedic Trauma  Adult Reconstruction  Department of Orthopedic Surgery  [facility]

## 2025-02-06 ENCOUNTER — OFFICE VISIT (OUTPATIENT)
Dept: ORTHOPEDICS | Facility: CLINIC | Age: 25
End: 2025-02-06
Payer: COMMERCIAL

## 2025-02-06 ENCOUNTER — ANCILLARY PROCEDURE (OUTPATIENT)
Dept: GENERAL RADIOLOGY | Facility: CLINIC | Age: 25
End: 2025-02-06
Attending: ORTHOPAEDIC SURGERY
Payer: MEDICARE

## 2025-02-06 VITALS — BODY MASS INDEX: 25.65 KG/M2 | DIASTOLIC BLOOD PRESSURE: 82 MMHG | SYSTOLIC BLOOD PRESSURE: 130 MMHG | WEIGHT: 222 LBS

## 2025-02-06 DIAGNOSIS — S42.291A OTHER CLOSED DISPLACED FRACTURE OF PROXIMAL END OF RIGHT HUMERUS, INITIAL ENCOUNTER: Primary | ICD-10-CM

## 2025-02-06 DIAGNOSIS — S42.291A OTHER CLOSED DISPLACED FRACTURE OF PROXIMAL END OF RIGHT HUMERUS, INITIAL ENCOUNTER: ICD-10-CM

## 2025-02-06 NOTE — LETTER
2/6/2025      Pavithra Davenport  [address] [facility] [address]      Dear Colleague,    Thank you for referring your patient, Pavithra Davenport, to the [facility] ORTHOPEDIC CLINIC [place]. Please see a copy of my visit note below.    [facility]   [facility]   ORTHOPEDIC SURGERY CLINIC  [place]    PATIENT:   Pavithra Davenport  YOB: 2000  DATE OF SERVICE:   02/06/25    CHIEF COMPLAINT:  Closed displaced comminuted fracture of proximal right humerus   DOI 10/19/2024     PROCEDURES:  None    HISTORY OF PRESENT ILLNESS:  12/3/2025 - Pavithra Davenport is a 24 year old who presents for routine follow-up 12 weeks post injury.  He completed physical therapy 12/10/2024 has not done therapy since.  He reports he has been working out intensely at the gym including heavy weights.  Aside from limited forward elevation he has not had any issues and is very pleased with his progress.  Not taking any pain medications currently. Denies numbness, tingling or weakness.  Denies new injury. He state the shoulder is doing well. He is not taking any pain medications, but does use Biofreeze occasionally.      PHYSICAL EXAM:  Awake alert and oriented in no apparent distress.  Body mass index is 25.65 kg/m .   Focused exam of the right upper extremity demonstrates no deformity.   Skin is clean, dry and intact.    No erythema, ecchymosis, swelling or warmth.   No lymphedema.   No tenderness to palpation about the shoulder girdle or upper extremity.   Shoulder range of motion forward elevation to 140 , abduction 100 , external rotation 50 , internal rotation L5.  Elbow range of motion extension  0 , flexion 130 .  Forearm range of motion pronation 85 , supination 85 .  Wrist range of motion extension 70  and flexion 80 .  Able to make a full composite fist  CMC Intact Distally  Sensation intact in axillary, lateral antebrachial cutaneous, radial median and ulnar distributions.  Motor  intact in deltoid, biceps, triceps, wrist flexors, wrist extensors, EPL, FPL, and hand intrinsics.   Radial pulse intact and capillary refill brisk    IMAGING:  AP, lateral and axillary view right shoulder obtained today personally reviewed by me  Stable fracture alignment with interval healing  Slight varus alignment  Congruent glenohumeral joint  No acute fractures  No degenerative changes    LABS:  None     ASSESSMENT:  Doing well overall with continued limited forward elevation typical  post injury stiffness versus element of frozen shoulder.  Slight varus alignment could be contributing as well.    PLAN:  Long detailed discussion with the patient.  I advised him that he has typical postop stiffness but may have an element of frozen shoulder.  I reassured him that his range of motion should continue to improve slowly in the coming months but it will be slow.  He may continue with activities to tolerance without any specific restrictions.  He should return in 3 months for repeat evaluation if needed.  Patient verbalized understanding of plan and is amenable.  All pressure answered detail to his satisfaction.    X-RAYS NEXT VISIT:  AP, lateral and axillary view right shoulder    Wade Freeman MD, FAAOS    Orthopedic Trauma  Adult Reconstruction  Department of Orthopedic Surgery  [facility]      Again, thank you for allowing me to participate in the care of your patient.        Sincerely,        Wade Freeman MD    Electronically signed